# Patient Record
Sex: FEMALE | Race: WHITE | NOT HISPANIC OR LATINO | URBAN - METROPOLITAN AREA
[De-identification: names, ages, dates, MRNs, and addresses within clinical notes are randomized per-mention and may not be internally consistent; named-entity substitution may affect disease eponyms.]

---

## 2017-11-30 ENCOUNTER — RECORDS - HEALTHEAST (OUTPATIENT)
Dept: LAB | Facility: CLINIC | Age: 67
End: 2017-11-30

## 2017-11-30 LAB
CHOLEST SERPL-MCNC: 183 MG/DL
FASTING STATUS PATIENT QL REPORTED: NORMAL
HDLC SERPL-MCNC: 55 MG/DL
LDLC SERPL CALC-MCNC: 99 MG/DL
TRIGL SERPL-MCNC: 144 MG/DL

## 2017-12-01 LAB — HCV AB SERPL QL IA: NEGATIVE

## 2018-04-24 ENCOUNTER — AMBULATORY - HEALTHEAST (OUTPATIENT)
Dept: NEUROSURGERY | Facility: CLINIC | Age: 68
End: 2018-04-24

## 2018-04-24 ENCOUNTER — RECORDS - HEALTHEAST (OUTPATIENT)
Dept: ADMINISTRATIVE | Facility: OTHER | Age: 68
End: 2018-04-24

## 2018-04-26 ENCOUNTER — OFFICE VISIT - HEALTHEAST (OUTPATIENT)
Dept: NEUROSURGERY | Facility: CLINIC | Age: 68
End: 2018-04-26

## 2018-04-26 DIAGNOSIS — M43.8X9 SAGITTAL PLANE IMBALANCE: ICD-10-CM

## 2018-04-26 DIAGNOSIS — G95.20 CORD COMPRESSION MYELOPATHY (H): ICD-10-CM

## 2018-04-26 RX ORDER — ALBUTEROL SULFATE 90 UG/1
1 AEROSOL, METERED RESPIRATORY (INHALATION) 4 TIMES DAILY PRN
Status: SHIPPED | COMMUNITY
Start: 2016-12-07

## 2018-04-30 ENCOUNTER — HOSPITAL ENCOUNTER (OUTPATIENT)
Dept: RADIOLOGY | Facility: HOSPITAL | Age: 68
Discharge: HOME | End: 2018-04-30
Attending: NEUROLOGICAL SURGERY

## 2018-04-30 DIAGNOSIS — M43.8X9 SAGITTAL PLANE IMBALANCE: ICD-10-CM

## 2018-05-01 ENCOUNTER — RECORDS - HEALTHEAST (OUTPATIENT)
Dept: RADIOLOGY | Facility: CLINIC | Age: 68
End: 2018-05-01

## 2018-05-03 ENCOUNTER — OFFICE VISIT - HEALTHEAST (OUTPATIENT)
Dept: NEUROSURGERY | Facility: CLINIC | Age: 68
End: 2018-05-03

## 2018-05-03 DIAGNOSIS — G95.20 CORD COMPRESSION MYELOPATHY (H): ICD-10-CM

## 2018-05-03 ASSESSMENT — MIFFLIN-ST. JEOR: SCORE: 1414.67

## 2018-05-07 ENCOUNTER — RECORDS - HEALTHEAST (OUTPATIENT)
Dept: ADMINISTRATIVE | Facility: OTHER | Age: 68
End: 2018-05-07

## 2018-05-08 ENCOUNTER — COMMUNICATION - HEALTHEAST (OUTPATIENT)
Dept: NEUROSURGERY | Facility: CLINIC | Age: 68
End: 2018-05-08

## 2018-06-07 ENCOUNTER — AMBULATORY - HEALTHEAST (OUTPATIENT)
Dept: NEUROSURGERY | Facility: CLINIC | Age: 68
End: 2018-06-07

## 2018-06-08 ASSESSMENT — MIFFLIN-ST. JEOR: SCORE: 1432.81

## 2018-06-11 ENCOUNTER — OFFICE VISIT - HEALTHEAST (OUTPATIENT)
Dept: NEUROSURGERY | Facility: CLINIC | Age: 68
End: 2018-06-11

## 2018-06-11 ENCOUNTER — ANESTHESIA - HEALTHEAST (OUTPATIENT)
Dept: SURGERY | Facility: HOSPITAL | Age: 68
End: 2018-06-11

## 2018-06-11 DIAGNOSIS — G95.20 CORD COMPRESSION (H): ICD-10-CM

## 2018-06-12 ENCOUNTER — SURGERY - HEALTHEAST (OUTPATIENT)
Dept: SURGERY | Facility: HOSPITAL | Age: 68
End: 2018-06-12

## 2018-06-13 ASSESSMENT — MIFFLIN-ST. JEOR: SCORE: 1432.81

## 2018-06-14 ENCOUNTER — COMMUNICATION - HEALTHEAST (OUTPATIENT)
Dept: NEUROSURGERY | Facility: CLINIC | Age: 68
End: 2018-06-14

## 2018-06-14 DIAGNOSIS — G95.20 CORD COMPRESSION MYELOPATHY (H): ICD-10-CM

## 2018-06-27 ENCOUNTER — AMBULATORY - HEALTHEAST (OUTPATIENT)
Dept: NEUROSURGERY | Facility: CLINIC | Age: 68
End: 2018-06-27

## 2018-07-09 ENCOUNTER — HOSPITAL ENCOUNTER (OUTPATIENT)
Dept: RADIOLOGY | Facility: HOSPITAL | Age: 68
Discharge: HOME | End: 2018-07-09
Attending: NEUROLOGICAL SURGERY

## 2018-07-09 ENCOUNTER — COMMUNICATION - HEALTHEAST (OUTPATIENT)
Dept: TELEHEALTH | Facility: CLINIC | Age: 68
End: 2018-07-09

## 2018-07-09 DIAGNOSIS — G95.20 CORD COMPRESSION MYELOPATHY (H): ICD-10-CM

## 2018-07-11 ENCOUNTER — OFFICE VISIT - HEALTHEAST (OUTPATIENT)
Dept: NEUROSURGERY | Facility: CLINIC | Age: 68
End: 2018-07-11

## 2018-07-11 DIAGNOSIS — G95.20 CERVICAL CORD COMPRESSION WITH MYELOPATHY (H): ICD-10-CM

## 2018-07-11 ASSESSMENT — MIFFLIN-ST. JEOR: SCORE: 1432.81

## 2018-07-30 ENCOUNTER — OFFICE VISIT - HEALTHEAST (OUTPATIENT)
Dept: PHYSICAL THERAPY | Facility: REHABILITATION | Age: 68
End: 2018-07-30

## 2018-07-30 DIAGNOSIS — R26.9 NEUROLOGIC GAIT DYSFUNCTION: ICD-10-CM

## 2018-07-30 DIAGNOSIS — G89.29 CHRONIC LOW BACK PAIN WITH SCIATICA: ICD-10-CM

## 2018-07-30 DIAGNOSIS — M54.2 ACUTE NECK PAIN: ICD-10-CM

## 2018-07-30 DIAGNOSIS — R26.89 POOR BALANCE: ICD-10-CM

## 2018-07-30 DIAGNOSIS — M54.41 CHRONIC RIGHT-SIDED LOW BACK PAIN WITH RIGHT-SIDED SCIATICA: ICD-10-CM

## 2018-07-30 DIAGNOSIS — G89.29 CHRONIC RIGHT-SIDED LOW BACK PAIN WITH RIGHT-SIDED SCIATICA: ICD-10-CM

## 2018-07-30 DIAGNOSIS — M54.40 CHRONIC LOW BACK PAIN WITH SCIATICA: ICD-10-CM

## 2018-07-30 DIAGNOSIS — M62.81 GENERALIZED MUSCLE WEAKNESS: ICD-10-CM

## 2018-08-06 ENCOUNTER — OFFICE VISIT - HEALTHEAST (OUTPATIENT)
Dept: PHYSICAL THERAPY | Facility: REHABILITATION | Age: 68
End: 2018-08-06

## 2018-08-06 DIAGNOSIS — R26.9 NEUROLOGIC GAIT DYSFUNCTION: ICD-10-CM

## 2018-08-06 DIAGNOSIS — M54.2 ACUTE NECK PAIN: ICD-10-CM

## 2018-08-06 DIAGNOSIS — G89.29 CHRONIC RIGHT-SIDED LOW BACK PAIN WITH RIGHT-SIDED SCIATICA: ICD-10-CM

## 2018-08-06 DIAGNOSIS — R26.89 POOR BALANCE: ICD-10-CM

## 2018-08-06 DIAGNOSIS — M54.41 CHRONIC RIGHT-SIDED LOW BACK PAIN WITH RIGHT-SIDED SCIATICA: ICD-10-CM

## 2018-08-06 DIAGNOSIS — M62.81 GENERALIZED MUSCLE WEAKNESS: ICD-10-CM

## 2018-08-13 ENCOUNTER — OFFICE VISIT - HEALTHEAST (OUTPATIENT)
Dept: PHYSICAL THERAPY | Facility: REHABILITATION | Age: 68
End: 2018-08-13

## 2018-08-13 DIAGNOSIS — M62.81 GENERALIZED MUSCLE WEAKNESS: ICD-10-CM

## 2018-08-13 DIAGNOSIS — R26.9 NEUROLOGIC GAIT DYSFUNCTION: ICD-10-CM

## 2018-08-13 DIAGNOSIS — M54.2 ACUTE NECK PAIN: ICD-10-CM

## 2018-08-13 DIAGNOSIS — M54.41 CHRONIC RIGHT-SIDED LOW BACK PAIN WITH RIGHT-SIDED SCIATICA: ICD-10-CM

## 2018-08-13 DIAGNOSIS — G89.29 CHRONIC RIGHT-SIDED LOW BACK PAIN WITH RIGHT-SIDED SCIATICA: ICD-10-CM

## 2018-08-13 DIAGNOSIS — R26.89 POOR BALANCE: ICD-10-CM

## 2018-09-17 ENCOUNTER — OFFICE VISIT - HEALTHEAST (OUTPATIENT)
Dept: PHYSICAL THERAPY | Facility: REHABILITATION | Age: 68
End: 2018-09-17

## 2018-09-17 DIAGNOSIS — R26.9 NEUROLOGIC GAIT DYSFUNCTION: ICD-10-CM

## 2018-09-17 DIAGNOSIS — G89.29 CHRONIC RIGHT-SIDED LOW BACK PAIN WITH RIGHT-SIDED SCIATICA: ICD-10-CM

## 2018-09-17 DIAGNOSIS — R26.89 POOR BALANCE: ICD-10-CM

## 2018-09-17 DIAGNOSIS — M54.2 ACUTE NECK PAIN: ICD-10-CM

## 2018-09-17 DIAGNOSIS — M62.81 GENERALIZED MUSCLE WEAKNESS: ICD-10-CM

## 2018-09-17 DIAGNOSIS — M54.41 CHRONIC RIGHT-SIDED LOW BACK PAIN WITH RIGHT-SIDED SCIATICA: ICD-10-CM

## 2018-11-19 ENCOUNTER — RECORDS - HEALTHEAST (OUTPATIENT)
Dept: LAB | Facility: CLINIC | Age: 68
End: 2018-11-19

## 2018-11-19 LAB
ALBUMIN SERPL-MCNC: 3.9 G/DL (ref 3.5–5)
ALP SERPL-CCNC: 100 U/L (ref 45–120)
ALT SERPL W P-5'-P-CCNC: 19 U/L (ref 0–45)
ANION GAP SERPL CALCULATED.3IONS-SCNC: 12 MMOL/L (ref 5–18)
AST SERPL W P-5'-P-CCNC: 18 U/L (ref 0–40)
BILIRUB SERPL-MCNC: 0.8 MG/DL (ref 0–1)
BUN SERPL-MCNC: 22 MG/DL (ref 8–22)
CALCIUM SERPL-MCNC: 9.8 MG/DL (ref 8.5–10.5)
CHLORIDE BLD-SCNC: 101 MMOL/L (ref 98–107)
CO2 SERPL-SCNC: 27 MMOL/L (ref 22–31)
CREAT SERPL-MCNC: 0.66 MG/DL (ref 0.6–1.1)
GFR SERPL CREATININE-BSD FRML MDRD: >60 ML/MIN/1.73M2
GLUCOSE BLD-MCNC: 89 MG/DL (ref 70–125)
POTASSIUM BLD-SCNC: 4.7 MMOL/L (ref 3.5–5)
PROT SERPL-MCNC: 7.1 G/DL (ref 6–8)
SODIUM SERPL-SCNC: 140 MMOL/L (ref 136–145)

## 2019-11-18 ENCOUNTER — RECORDS - HEALTHEAST (OUTPATIENT)
Dept: LAB | Facility: CLINIC | Age: 69
End: 2019-11-18

## 2019-11-18 LAB
ALBUMIN SERPL-MCNC: 3.9 G/DL (ref 3.5–5)
ALP SERPL-CCNC: 95 U/L (ref 45–120)
ALT SERPL W P-5'-P-CCNC: 15 U/L (ref 0–45)
ANION GAP SERPL CALCULATED.3IONS-SCNC: 12 MMOL/L (ref 5–18)
AST SERPL W P-5'-P-CCNC: 15 U/L (ref 0–40)
BILIRUB SERPL-MCNC: 0.8 MG/DL (ref 0–1)
BUN SERPL-MCNC: 22 MG/DL (ref 8–28)
CALCIUM SERPL-MCNC: 9.3 MG/DL (ref 8.5–10.5)
CHLORIDE BLD-SCNC: 105 MMOL/L (ref 98–107)
CHOLEST SERPL-MCNC: 182 MG/DL
CO2 SERPL-SCNC: 26 MMOL/L (ref 22–31)
CREAT SERPL-MCNC: 0.7 MG/DL (ref 0.6–1.1)
ERYTHROCYTE [DISTWIDTH] IN BLOOD BY AUTOMATED COUNT: 13.6 % (ref 11–14.5)
FASTING STATUS PATIENT QL REPORTED: ABNORMAL
GFR SERPL CREATININE-BSD FRML MDRD: >60 ML/MIN/1.73M2
GLUCOSE BLD-MCNC: 96 MG/DL (ref 70–125)
HCT VFR BLD AUTO: 43.1 % (ref 35–47)
HDLC SERPL-MCNC: 49 MG/DL
HGB BLD-MCNC: 13.6 G/DL (ref 12–16)
LDLC SERPL CALC-MCNC: 101 MG/DL
MCH RBC QN AUTO: 30.9 PG (ref 27–34)
MCHC RBC AUTO-ENTMCNC: 31.6 G/DL (ref 32–36)
MCV RBC AUTO: 98 FL (ref 80–100)
PLATELET # BLD AUTO: 254 THOU/UL (ref 140–440)
PMV BLD AUTO: 10.6 FL (ref 8.5–12.5)
POTASSIUM BLD-SCNC: 4.8 MMOL/L (ref 3.5–5)
PROT SERPL-MCNC: 7 G/DL (ref 6–8)
RBC # BLD AUTO: 4.4 MILL/UL (ref 3.8–5.4)
SODIUM SERPL-SCNC: 143 MMOL/L (ref 136–145)
T3 SERPL-MCNC: 79 NG/DL (ref 45–175)
T3FREE SERPL-MCNC: 2.8 PG/ML (ref 1.9–3.9)
T4 FREE SERPL-MCNC: 1 NG/DL (ref 0.7–1.8)
TRIGL SERPL-MCNC: 162 MG/DL
TSH SERPL DL<=0.005 MIU/L-ACNC: 2.21 UIU/ML (ref 0.3–5)
WBC: 8.6 THOU/UL (ref 4–11)

## 2019-11-21 LAB — T3REVERSE SERPL-MCNC: 23.4 NG/DL (ref 9–27)

## 2019-11-25 LAB
B BURGDOR AB SER-IMP: NORMAL
LYME AB IGG BAND(S): NORMAL
LYME AB IGM BAND(S): NORMAL
LYME IGG BLOT: NEGATIVE
LYME IGM BLOT: NEGATIVE

## 2021-04-08 ENCOUNTER — RECORDS - HEALTHEAST (OUTPATIENT)
Dept: LAB | Facility: CLINIC | Age: 71
End: 2021-04-08

## 2021-04-08 LAB
ALBUMIN SERPL-MCNC: 3.9 G/DL (ref 3.5–5)
ALP SERPL-CCNC: 83 U/L (ref 45–120)
ALT SERPL W P-5'-P-CCNC: 15 U/L (ref 0–45)
ANION GAP SERPL CALCULATED.3IONS-SCNC: 11 MMOL/L (ref 5–18)
AST SERPL W P-5'-P-CCNC: 14 U/L (ref 0–40)
BILIRUB SERPL-MCNC: 0.9 MG/DL (ref 0–1)
BUN SERPL-MCNC: 19 MG/DL (ref 8–28)
CALCIUM SERPL-MCNC: 8.7 MG/DL (ref 8.5–10.5)
CHLORIDE BLD-SCNC: 103 MMOL/L (ref 98–107)
CHOLEST SERPL-MCNC: 179 MG/DL
CO2 SERPL-SCNC: 27 MMOL/L (ref 22–31)
CREAT SERPL-MCNC: 0.69 MG/DL (ref 0.6–1.1)
FASTING STATUS PATIENT QL REPORTED: ABNORMAL
GFR SERPL CREATININE-BSD FRML MDRD: >60 ML/MIN/1.73M2
GLUCOSE BLD-MCNC: 95 MG/DL (ref 70–125)
HDLC SERPL-MCNC: 51 MG/DL
LDLC SERPL CALC-MCNC: 93 MG/DL
POTASSIUM BLD-SCNC: 4.3 MMOL/L (ref 3.5–5)
PROT SERPL-MCNC: 6.9 G/DL (ref 6–8)
SODIUM SERPL-SCNC: 141 MMOL/L (ref 136–145)
TRIGL SERPL-MCNC: 173 MG/DL
VIT B12 SERPL-MCNC: 687 PG/ML (ref 213–816)

## 2021-04-09 LAB — 25(OH)D3 SERPL-MCNC: 54.3 NG/ML (ref 30–80)

## 2021-06-01 VITALS — BODY MASS INDEX: 34.16 KG/M2 | HEIGHT: 65 IN | WEIGHT: 205 LBS

## 2021-06-01 VITALS — WEIGHT: 201 LBS | HEIGHT: 65 IN | BODY MASS INDEX: 33.49 KG/M2

## 2021-06-01 VITALS — HEIGHT: 65 IN | BODY MASS INDEX: 34.16 KG/M2 | WEIGHT: 205 LBS

## 2021-06-16 PROBLEM — G95.20 CERVICAL CORD COMPRESSION WITH MYELOPATHY (H): Status: ACTIVE | Noted: 2018-06-12

## 2021-06-17 NOTE — PROGRESS NOTES
NEUROSURGERY CONSULTATION NOTE:    Assessment:    1. Sagittal plane imbalance  XR Scoliosis AP and Lateral Standing   2. Cord compression myelopathy         Plan: I have asked her to go for scoliosis x-rays to check for sagittal imbalance.  Her head and neck are very far forward and appear to be out of line with her pelvis.  She also has multilevel degenerative disease in both the cervical and lumbar spine.  This would be in keeping with sagittal imbalance.  I will see her back in a week after the x-rays are reviewed.  We will try to present her at the conference on Wednesday.  Would like to hear what my partners in spine have to say regarding recommendations.  Time spent in evaluation of available information, personal interpretation of imaging, documentation, coordination of care and face-to-face discussion was 45 minutes.    Olesya Stephen   [address] [facility] [id number]  67 y.o. female is sent to me in consultation   by Francine Piña MD     CC: Neck and bilateral shoulder pain    HPI:  Neurosurgery consultation was requested by: Dr. Francine Piña  Pain: Neck pain   Radicular Pain is present: Radiates into both shoulders and occasionally down both arms, right worse   Lhermitte sign: No   Motor complaints: Weakness in both arms and both legs   Sensory complaints: Numbness in both arms and legs   Gait and balance issues: Pt has no balance and gait is off  Bowel or bladder issues: Slight bowel numbness   Duration of SX is: 14 years, progressed in the last 4 years   The symptoms are worse with: Siting in hard chair and standing and walking   The symptoms are better with: Laying down   Injury: Not sure   Severity is:Chronic   Patient has tried the following conservative measures: Pt has done PT and gets short term relief.      PROBLEM LIST:  1. Sagittal plane imbalance     2. Cord compression myelopathy            REVIEW OF SYSTEMS:  A 12 point review of systems has been completed.  She has occasional bleeding  from hemorrhoids.  She can also suffer from constipation.  She has a personal history of arthritis.  The neurological symptoms are listed above.  Otherwise, the review is negative      Past Medical History:   Diagnosis Date     Hyperlipidemia         Vitamin D deficiency       Lumbar radiculopathy       Seborrhea      Past surgical history:  Adenoidectomy  Hysterectomy with cystocele and rectocele repair   Bilateral cataracts with lens replacement         MEDICATIONS:  Current Outpatient Prescriptions   Medication Sig Dispense Refill     cholecalciferol, vitamin D3, 1,000 unit Chew        simvastatin (ZOCOR) 20 MG tablet TAKE 1 TABLET BY MOUTH DAILY AT BEDTIME       albuterol (PROAIR HFA;PROVENTIL HFA;VENTOLIN HFA) 90 mcg/actuation inhaler Inhale 1 puff.       cyanocobalamin, vitamin B-12, 2,500 mcg Chew Chew.       No current facility-administered medications for this visit.          ALLERGIES/SENSITIVITIES:     Allergies   Allergen Reactions     Penicillins Unknown     Childhood Rxn       PERTINENT SOCIAL HISTORY:   Social History     Social History     Marital status:      Spouse name: N/A     Number of children: N/A     Years of education: N/A     Social History Main Topics     Smoking status: Never Smoker     Smokeless tobacco: Never Used     Alcohol use None     Drug use: None     Sexual activity: Not Asked       FAMILY HISTORY:  No family history of inherited spinal disorders  Father is  at 84 years with heart disease  Mother is  at 83 years with glaucoma and heart disease    PHYSICAL EXAM:   Constitutional: /74  Pulse 84  SpO2 92%    General appearance: Appropriately groomed.  No acute distress.  Interactive.     Mental Status: Mental status: Alert and oriented, mood and affect appropriate, language reception and expression normal, recent and remote memory is normal, higher cortical function normal. Attention span, concentration and ability to follow commands is  normal.       Cranial Nerves: Face is symmetric.  Extraocular movements are full, conjugate and without nystagmus.  Hearing is preserved.  Shoulder position is symmetric.  Tongue is midline with normal motion.       Motor: Motor exam nl bilateral UE and LE to confrontation testing, but she feels weakness compared to status 10 years ago. Tone nl, bulk nl and strength 5/5 all groups.      Sensory: Sensory exam by subjective report intact to LT,PP,Position and Vib. in the UE and  LE, with the exception of tingling in the arms and legs.     Station and Gait:  Unsteady wide based gait.     Reflexes; reflexes are hard to elicit.  Negative Chaudhary's.    IMAGING:  I have personally reviewed the images and discussed the findings with Olesya Stephen.  T2 hyperintensity is noted from C2 through C7.  This represents myelomalacia.  She has significant spinal canal stenosis at C3-4, 4 5, 5 6 and 6 7.  He has multilevel and bilateral foraminal stenosis discussed further in the report.  She also has hyperlordosis.    CC:     Francine Piña [id number] [place] [address]

## 2021-06-17 NOTE — PROGRESS NOTES
Olesya is here to discuss results of x-rays.     She states symptoms are unchanged since last seen in clinic.   Olesya c/o neck pain radiating into both shoulders and arms. She has weakness and numbness in both arms and legs as well.   NDI today is 42%    I did review these strays and find that there is no significant sagittal imbalance or scoliosis.  I have recommended an open right C3 through C7 laminoplasty.  I discussed the risks and benefits of this intervention to include the risk of infection, bleeding and persistent neurological symptoms.  I also discussed that she would be wearing a collar for 3-4 weeks after surgery.  They would like to do this in early June.  I will go ahead and submit the orders.  Time spent in evaluation of the standing images, documentation, coordination of care and face-to-face discussion was 15 minutes.    Fernanda Jones MD, FACS, FAANS

## 2021-06-17 NOTE — PROGRESS NOTES
Olesya is here to discuss results of x-rays. She states symptoms are unchanged since last seen in clinic. Olesya c/o neck pain radiating into both shoulders and arms. She has weakness and numbness in both arms and legs as well.   NDI today is 42%  JShowen,CMA

## 2021-06-17 NOTE — PROGRESS NOTES
Neurosurgery consultation was requested by: Dr. Francine Piña  Pain: Neck pain   Radicular Pain is present: Radiates into both shoulders and occasionally down both arms, right worse   Lhermitte sign: No   Motor complaints: Weakness in both arms and both legs   Sensory complaints: Numbness in both arms and legs   Gait and balance issues: Pt has no balance and gait is off  Bowel or bladder issues: Slight bowel numbness   Duration of SX is: 14 years, progressed in the last 4 years   The symptoms are worse with: Siting in hard chair and standing and walking   The symptoms are better with: Laying down   Injury: Not sure   Severity is:Chronic   Patient has tried the following conservative measures: Pt has done PT and gets short term relief.  NDI score is :   JAMARCUS Wang

## 2021-06-18 NOTE — ANESTHESIA PREPROCEDURE EVALUATION
Anesthesia Evaluation      Patient summary reviewed   History of anesthetic complications     Airway   Mallampati: I  Neck ROM: full   Pulmonary - normal exam   (+) asthma  mild,  well controlled,                          Cardiovascular - normal exam  (+) , hypercholesterolemia,     (-) murmur  ECG reviewed  Rhythm: regular  Rate: normal,    no murmur      Neuro/Psych    (+) neuromuscular disease,      Endo/Other - negative ROS      GI/Hepatic/Renal - negative ROS           Dental - normal exam                        Anesthesia Plan  Planned anesthetic: general endotracheal  Decadron,Zofran,background propofol  ASA 2   Induction: intravenous   Anesthetic plan and risks discussed with: patient  Anesthesia plan special considerations: antiemetics, arterial catheterization,   Post-op plan: routine recovery

## 2021-06-18 NOTE — PROGRESS NOTES
Preop Assessment: Olesya Stephen presents for pre-op review.  Surgeon: Dr. Fernanda Jones  Name of Surgery: Right open cervical laminoplasty C3-C7  Diagnosis: cord compression  Date of Surgery: 06/12/2018  Time of Surgery: 0730  Hospital: [facility]  H&P: by Dr. Piña on 6/6/2018 - cleared for surgery  History of ASA, NSAIDS, vitamin and/or herbal supplements within 10 days: Yes - stopped ASA and NSAIDS  History of blood thinners: No  History of anti-seizure med's: No  Review of systems: unchanged    Diagnostics:  Labs: WNL  CXR: n/a  EKG: NSR  Other: [employer] J collar to OR  Films: MRI cervical from 2/21/2018 - in Nil      Last BM - 3/10/2018  Nausea or Vomiting - denies  Urinary retention - denies  Pain management - no Red Flags  Home PT Evaluation: ambulates independently, steady gait and stride, no imbalance noted  - no indication for Home PT pre-op  Patient confirmed they have help/assistance in place at home upon discharge      All questions answered regarding surgery and expected pre and postoperative course including rehabilitation phase.     Reviewed with patient: Arrive 2.5 -3 hours prior to scheduled surgery, nothing to eat or drink after midnight the night before surgery and bring all pertinent films to the hospital the day of surgery.  Continue to refrain from NSAIDS (Ibuprofen, Aleve, Naprosyn) ASA or over the counter herbal medication or supplements, anticoagulants and blood thinners.    Preop skin preparations and instructions provided.    Incentive Spirometer usage instructions provided.    Patient confirmed they have help/assistance in place at home upon discharge.    Consent was signed.    Cristin Azul RN, CNRN

## 2021-06-18 NOTE — PROGRESS NOTES
Olesya Stephen is status post open door laminoplasty C3-C7 open on right, Cervical foraminotomies at C4-5, C5-6, and C6-7, right on 6/12/2018 by Dr. Ferannda Jones.  Preoperatively presented with complaints of progressive myelopathy.  Today she is here with her sister for staples removal. She is doing very well - her incisional discomfort is minimal. She denies radicular pain, sensory or motor issues in UE. Gait and balance are normal. Uses heat on her shoulders. Wears a [employer] J collar.      Surgical wound WNL - CDI, no signs of infection or skin breakdown.  Incision well-healed: good skin approximation, no redness or visible/palpable edema, no tenderness to palpation.  PT. AF, denies fever, chills or sweats.  Pt. reports that the symptoms are improved from pre-op.    Staples - intact removed without difficulty. Wound prepped with Betadine before and after removal.  Surrounding skin has no signs of breakdown.  Verbal instructions regarding incision care are given.  Pt. advised to call us if any s/s of infection noted - all discussed in details.

## 2021-06-18 NOTE — ANESTHESIA CARE TRANSFER NOTE
Last vitals:   Vitals:    06/12/18 1126   BP: 176/87   Pulse: (!) 103   Resp: 16   Temp: 36.2  C (97.2  F)   SpO2: 99%     Patient's level of consciousness is drowsy  Spontaneous respirations: yes  Maintains airway independently: yes  Dentition unchanged: yes  Oropharynx: oropharynx clear of all foreign objects    QCDR Measures:  ASA# 20 - Surgical Safety Checklist: WHO surgical safety checklist completed prior to induction  PQRS# 430 - Adult PONV Prevention: 4558F - Pt received => 2 anti-emetic agents (different classes) preop & intraop  ASA# 8 - Peds PONV Prevention: NA - Not pediatric patient, not GA or 2 or more risk factors NOT present  PQRS# 424 - [place]-op Temp Management: 4559F - At least one body temp DOCUMENTED => 35.5C or 95.9F within required timeframe  PQRS# 426 - PACU Transfer Protocol: - Transfer of care checklist used  ASA# 14 - Acute Post-op Pain: ASA14B - Patient did NOT experience pain >= 7 out of 10

## 2021-06-18 NOTE — ANESTHESIA POSTPROCEDURE EVALUATION
Patient: Olesya Stephen  RIGHT OPEN CERVICAL 3,4,5 Laminectomy ,CERVICAL 7 LAMINOPLASTY  Anesthesia type: general    Patient location: PACU  Last vitals:   Vitals:    06/12/18 1215   BP: 153/87   Pulse: 92   Resp: 19   Temp:    SpO2: 96%     Post vital signs: stable  Level of consciousness: awake and responds to simple questions  Post-anesthesia pain: pain controlled  Post-anesthesia nausea and vomiting: no  Pulmonary: unassisted, return to baseline  Cardiovascular: stable and blood pressure at baseline  Hydration: adequate  Anesthetic events: no    QCDR Measures:  ASA# 11 - [place]-op Cardiac Arrest: ASA11B - Patient did NOT experience unanticipated cardiac arrest  ASA# 12 - [place]-op Mortality Rate: ASA12B - Patient did NOT die  ASA# 13 - PACU Re-Intubation Rate: ASA13B - Patient did NOT require a new airway mgmt  ASA# 10 - Composite Anes Safety: ASA10A - No serious adverse event    Additional Notes:

## 2021-06-19 NOTE — PROGRESS NOTES
[facility] Daily Progress     Patient Name: Olesya Stephen  Date: 8/13/2018  Visit #: 3/12  Referring Provider: Radha Swann CNP  Referring Diagnosis:   Cervical cord compression with myelopathy (H) [G95.20]  - Primary          Visit Diagnosis:     ICD-10-CM    1. Acute neck pain M54.2    2. Generalized muscle weakness M62.81    3. Poor balance R26.89    4. Neurologic gait dysfunction R26.9    5. Chronic right-sided low back pain with right-sided sciatica M54.41     G89.29        Assessment:     Pt demo's good laterality and graphesthesia B hand sensation but diminished stereognosis R hand.    Pt able to perform walking program up to 20 minutes.       Patient is benefitting from skilled physical therapy and is making steady progress toward functional goals.  Patient is appropriate to continue with skilled physical therapy intervention, as indicated by initial plan of care.    Goal Status: - ongoing  Pt. will demonstrate/verbalize independence in self-management of condition in : 12 weeks    Pt. will be able to walk : 30 minutes;with less pain;with less difficulty;for household mobility;for community mobility;for exercise/recreation;in 12 weeks - had a day of stiffness last week but by end of day felt okay    Patient will ascend / descend: stairs;with less pain;with less difficulty;in 12 weeks - IMPROVING for R hip pain    Patient will transfer: sit/stand;floor/stand;for car;for toileting;for in/out of bed;for in/out of chair;supine/sit;with less pain;with less difficulty;in 12 weeks - IMPROVING - less pain in hip    Pt will: be able to perform knitting and quilting with minimal pain and difficullty in 12 weeks for improved QOL. - IMPROVING - on her 6th prayer shawl    Plan / Patient Education:     Continue with initial plan of care.   Assess response to shoulder rows and chin tucks.  Assess stereognosis with dominoes or bucket of beads.  Attempt chin tuck progression and trunk  strengthening.    Subjective:     Pain Ratin/10  R hip/low back pain sore today.  Pt reports some tenderness to neck with sitting extended but otherwise that is feeling good.    Patient Outcome Measures:  Neck Disability Score in %: 30   Scores range from 0-100%, where a score of 0% represents minimal pain and maximal function. The minmal clinically important difference is a score reduction of 10%. FROM INITIAL    Objective:     Laterality 95% with Recognise shoulder lilly with 1.4 sec processing time (normative data is % accuracy with 2.0 sec processing speed)    Graphesthesia 90% 0-9 dorsal hand B     Stereognosis - pt had moderate difficulty with finding small and similar feeling items in pillow case    FROM LAST VISITS   Mild difficulty with shoulder strengthening exercises, moderate difficulty without increase in back pain with core/hip strengthening exercises    Treatment Today      TREATMENT MINUTES COMMENTS   Evaluation     Self-care/ Home management     Manual therapy     Neuromuscular Re-education 30 Assessed laterality, graphesthesia and stereognosis. Added stereognosis to home program with written instructions printed.   Therapeutic Activity     Therapeutic Exercises 15 Performed and added to HEP per pt instructions and printed for home.   Gait training     Modality__________________                Total 45    Blank areas are intentional and mean the treatment did not include these items.       Abril Celaya PT, DPT, OCS, CLT  2018  11:05 AM

## 2021-06-19 NOTE — PROGRESS NOTES
[facility] Daily Progress     Patient Name: Olesya Stephen  Date: 2018  Visit #:   Referring Provider: Radha Swann CNP  Referring Diagnosis:   Cervical cord compression with myelopathy (H) [G95.20]  - Primary          Visit Diagnosis:     ICD-10-CM    1. Acute neck pain M54.2    2. Generalized muscle weakness M62.81    3. Poor balance R26.89    4. Neurologic gait dysfunction R26.9    5. Chronic right-sided low back pain with right-sided sciatica M54.41     G89.29        Assessment:     Pt able to perform walking program up to 20 minutes. Pain can vary with weather changes.    Patient is benefitting from skilled physical therapy and is making steady progress toward functional goals.  Patient is appropriate to continue with skilled physical therapy intervention, as indicated by initial plan of care.    Goal Status: - ongoing  Pt. will demonstrate/verbalize independence in self-management of condition in : 12 weeks  Pt. will be able to walk : 30 minutes;with less pain;with less difficulty;for household mobility;for community mobility;for exercise/recreation;in 12 weeks  Patient will ascend / descend: stairs;with less pain;with less difficulty;in 12 weeks  Patient will transfer: sit/stand;floor/stand;for car;for toileting;for in/out of bed;for in/out of chair;supine/sit;with less pain;with less difficulty;in 12 weeks  Pt will: be able to perform knitting and quilting with minimal pain and difficullty in 12 weeks for improved QOL.    Plan / Patient Education:     Continue with initial plan of care.   Assess response to shoulder scap plane and ER strength and bridges and hip SLR.  Assess laterality and graphesthesia for B hand and foot sensory discrimination.  Attempt rows and chin tucks.    Subjective:     Pain Ratin  Pt reports she can have good days and not so good days. Weather seems to play a role in how well pt feels.  Pt reports walking about 15-20 minute walks - had 1 with hills  and wasn't too short of breath.    Patient Outcome Measures:  Neck Disability Score in %: 30   Scores range from 0-100%, where a score of 0% represents minimal pain and maximal function. The minmal clinically important difference is a score reduction of 10%.    Objective:      Mild difficulty with shoulder strengthening exercises, moderate difficulty without increase in back pain with core/hip strengthening exercises    Treatment Today      TREATMENT MINUTES COMMENTS   Evaluation     Self-care/ Home management     Manual therapy     Neuromuscular Re-education     Therapeutic Activity     Therapeutic Exercises 30 Discussed walking program.  Performed and initiated HEP per pt instructions and printed for home.   Gait training     Modality__________________                Total 30    Blank areas are intentional and mean the treatment did not include these items.       Abril Celaya PT, DPT, OCS, CLT  8/6/2018  11:05 AM

## 2021-06-19 NOTE — PROGRESS NOTES
"CHART NOTE     DATE OF SERVICE:  2018     : 1950   67 y.o.     ASSESSMENT :   Doing well with improvement in symptoms and function.       PLAN: Wean from collar/brace. Loosen restrictions. Refer to PT. RTC prn. Enc to call with any new questions or concerns.     HPI:  Olesya Stephen is status post  open door laminoplasty C7 open on right, Cervical cervical laminectomies C3-6 and foraminotomies at C4-5, C5-6, and C6-7, right on 2018 by Dr. Fernanda Jones.  Preoperatively presented with complaints of progressive myelopathy.      TODAY, Olesya Stephen reports min neck pain, has the anticipated shoulder pain.  No arm pain, no new N/T-had a right shoulder injury years ago and the R arm has always bothered her some since.  Balance and walking are improved. Can walk now without weaving.      PAST MEDICAL HISTORY, SURGICAL HISTORY, REVIEW OF SYMPTOMS, MEDICATIONS AND ALLERGIES:  Past medical history, surgical history, ROS, medications and allergies reviewed with patient and remain unchanged from previous visit.    Past Medical History:   Diagnosis Date     B12 deficiency      Hyperlipidemia      Lumbar radiculopathy      Neck pain      PONV (postoperative nausea and vomiting)      Seborrhea      Vitamin D deficiency        PHYSICAL EXAM:    /75  Pulse 74  Ht 5' 4.5\" (1.638 m)  Wt 205 lb (93 kg)  SpO2 96%  BMI 34.64 kg/m2      Neurological exam reveals:  Respirations easy, non-labored.   Skin: W/D/I. No rashes, lesions or breaks in integrity.   Recent and remote memory intact, fund of knowledge wnl.    Alert and oriented x3, speech fluent and appropriate.   PERRL, EOMI, No nystagmus,   Face symmetric, tongue midline, Uvula midline,  palate rises with phonation   Shoulder shrug equal  Arm strength bilateral grasp, biceps, triceps, and deltoids 5/5   Leg strength bilateral dorsiflexion, plantar flexion, and hip flexion 5/5  No extremity edema noted.   Can heel/toe walk, do toe rises and squats " without difficulty.   Muscle Bulk and tone wnl.   Reflexes: No pathological reflexes   Gait and station:Normal, can tandem gait now  Incision: CDI without erythema or edema  NDI/DOROTHEA: 9%     RADIOGRAPHIC IMAGING: Head CT:  Decompressive laminectomies C3-C6 and right-sided laminoplasty at C7, hardware intact and alignment maintained.     Films personally reviewed and interpreted.   Reviewed imaging with patient and family.

## 2021-06-20 NOTE — PROGRESS NOTES
[facility] Discharge Summary    Patient Name: Olesya Stephen  Date: 12/31/2018  Referring provider: Radha Swann CNP  Visit Diagnosis:     ICD-10-CM    1. Acute neck pain M54.2    2. Generalized muscle weakness M62.81    3. Poor balance R26.89    4. Neurologic gait dysfunction R26.9    5. Chronic right-sided low back pain with right-sided sciatica M54.41     G89.29        Goal Status:  See status in note below.    Patient was seen for 4 visits from 7/30/18 to 9/17/18 with 1 cancelled appointment.    The patient attended therapy initially, but did not finish the therapy sessions prescribed as pt did not return for f/u.    Therapy will be discontinued at this time.  The patient will need a new referral to resume.    Thank you for your referral.  Abril Celaya PT, DPT, OCS, CLT  12/31/2018   2:50 PM      [facility] Daily Progress     Patient Name: Olesya Stephen  Date: 9/17/2018  Visit #: 4/12  Referring Provider: Radha Swann CNP  Referring Diagnosis:   Cervical cord compression with myelopathy (H) [G95.20]  - Primary          Visit Diagnosis:     ICD-10-CM    1. Acute neck pain M54.2    2. Generalized muscle weakness M62.81    3. Poor balance R26.89    4. Neurologic gait dysfunction R26.9    5. Chronic right-sided low back pain with right-sided sciatica M54.41     G89.29        Assessment:     Pt demo's improved neck ROM, limited ankle DF and limited hip strength.    Patient is benefitting from skilled physical therapy and is making steady progress toward functional goals.  Patient is appropriate to continue with skilled physical therapy intervention, as indicated by initial plan of care.    Goal Status: - ongoing  Pt. will demonstrate/verbalize independence in self-management of condition in : 12 weeks    Pt. will be able to walk : 30 minutes;with less pain;with less difficulty;for household mobility;for community mobility;for exercise/recreation;in 12 weeks - had a day of  stiffness last week but by end of day felt okay    Patient will ascend / descend: stairs;with less pain;with less difficulty;in 12 weeks - IMPROVING for R hip pain    Patient will transfer: sit/stand;floor/stand;for car;for toileting;for in/out of bed;for in/out of chair;supine/sit;with less pain;with less difficulty;in 12 weeks - IMPROVING - less pain in hip    Pt will: be able to perform knitting and quilting with minimal pain and difficullty in 12 weeks for improved QOL. - IMPROVING - on her 6th prayer shawl    Plan / Patient Education:     Continue with initial plan of care.   Attempt chin tuck progression and trunk strengthening.  Reassess APTA sit to stand and add for home program if able.  Reassess 2 minute walk test.    Subjective:     Pain Ratin/10 - medium pain now with calf and legs.    Pt reports doing well with neck pain - not bothering her much.    Pt reports she went to a [employer] game 3 weeks ago and felt her R calf really cramp up. Pt was able to stretch it can get some relief over a couple of days.  Then pt was traveling and doing a lot of walking in airports and a lot of stairs at their rental and felt R calf flare up again but this soreness went away quickly as well.    Pt has been playing cards with her  and shuffling cards and this feels like she is getting more dexterity with hands from this.    R hip/low back pain continue to be sore and felt like activity level with traveling aggravated. - Pt will ask primary MD for possibly another lumbar injection as she had success with this in the past.    Pt reports some tenderness to neck with sitting extended but otherwise that is feeling good.    Patient Outcome Measures:  Neck Disability Score in %: 30   Scores range from 0-100%, where a score of 0% represents minimal pain and maximal function. The minmal clinically important difference is a score reduction of 10%. FROM INITIAL    Objective:     Chin tuck x12 mmHg x10 seconds - and able to  do head lift and maintain chin tuck    Cervical R rotation 50   (was 35) and L rotation 55   (was 65), B SB 8 cm (was 9-10 cm) - mild R sided soreness since her fall on her R side    B ankle DF 2  and PF 77-80      Pt demo's B ankle valgus and pronation with stance.    FROM LAST VISITS  Laterality 95% with Recognise shoulder lilly with 1.4 sec processing time (normative data is % accuracy with 2.0 sec processing speed)    Graphesthesia 90% 0-9 dorsal hand B     Stereognosis - pt had moderate difficulty with finding small and similar feeling items in pillow case    Treatment Today      TREATMENT MINUTES COMMENTS   Evaluation     Self-care/ Home management     Manual therapy     Neuromuscular Re-education     Therapeutic Activity     Therapeutic Exercises 54 Reassessed neck ROM and strength and discussed results. Assessed B ankle ROM and discussed results.   Discussed progress and modifications to HEP and activity level.   Performed and added to HEP per pt instructions and printed for home.   Gait training     Modality__________________                Total 54    Blank areas are intentional and mean the treatment did not include these items.       Abril Celaya PT, DPT, OCS, CLT  9/17/2018  11:05 AM

## 2021-06-26 ENCOUNTER — HEALTH MAINTENANCE LETTER (OUTPATIENT)
Age: 71
End: 2021-06-26

## 2021-10-16 ENCOUNTER — HEALTH MAINTENANCE LETTER (OUTPATIENT)
Age: 71
End: 2021-10-16

## 2022-04-28 ENCOUNTER — LAB REQUISITION (OUTPATIENT)
Dept: LAB | Facility: CLINIC | Age: 72
End: 2022-04-28

## 2022-04-28 DIAGNOSIS — E78.2 MIXED HYPERLIPIDEMIA: ICD-10-CM

## 2022-04-28 DIAGNOSIS — K21.9 GASTRO-ESOPHAGEAL REFLUX DISEASE WITHOUT ESOPHAGITIS: ICD-10-CM

## 2022-04-28 DIAGNOSIS — G57.93 UNSPECIFIED MONONEUROPATHY OF BILATERAL LOWER LIMBS: ICD-10-CM

## 2022-04-28 LAB
ALBUMIN SERPL-MCNC: 3.8 G/DL (ref 3.5–5)
ALP SERPL-CCNC: 82 U/L (ref 45–120)
ALT SERPL W P-5'-P-CCNC: 13 U/L (ref 0–45)
ANION GAP SERPL CALCULATED.3IONS-SCNC: 13 MMOL/L (ref 5–18)
AST SERPL W P-5'-P-CCNC: 15 U/L (ref 0–40)
BILIRUB SERPL-MCNC: 0.6 MG/DL (ref 0–1)
BUN SERPL-MCNC: 21 MG/DL (ref 8–28)
CALCIUM SERPL-MCNC: 9.2 MG/DL (ref 8.5–10.5)
CHLORIDE BLD-SCNC: 104 MMOL/L (ref 98–107)
CHOLEST SERPL-MCNC: 176 MG/DL
CO2 SERPL-SCNC: 26 MMOL/L (ref 22–31)
CREAT SERPL-MCNC: 0.64 MG/DL (ref 0.6–1.1)
ERYTHROCYTE [DISTWIDTH] IN BLOOD BY AUTOMATED COUNT: 13.4 % (ref 10–15)
FASTING STATUS PATIENT QL REPORTED: NORMAL
FOLATE SERPL-MCNC: 3.3 NG/ML
GFR SERPL CREATININE-BSD FRML MDRD: >90 ML/MIN/1.73M2
GLUCOSE BLD-MCNC: 90 MG/DL (ref 70–125)
HCT VFR BLD AUTO: 44.4 % (ref 35–47)
HDLC SERPL-MCNC: 55 MG/DL
HGB BLD-MCNC: 13.9 G/DL (ref 11.7–15.7)
LDLC SERPL CALC-MCNC: 94 MG/DL
MCH RBC QN AUTO: 30.2 PG (ref 26.5–33)
MCHC RBC AUTO-ENTMCNC: 31.3 G/DL (ref 31.5–36.5)
MCV RBC AUTO: 96 FL (ref 78–100)
PLATELET # BLD AUTO: 247 10E3/UL (ref 150–450)
POTASSIUM BLD-SCNC: 4.7 MMOL/L (ref 3.5–5)
PROT SERPL-MCNC: 6.8 G/DL (ref 6–8)
RBC # BLD AUTO: 4.61 10E6/UL (ref 3.8–5.2)
SODIUM SERPL-SCNC: 143 MMOL/L (ref 136–145)
TRIGL SERPL-MCNC: 137 MG/DL
VIT B12 SERPL-MCNC: 410 PG/ML (ref 213–816)
WBC # BLD AUTO: 10 10E3/UL (ref 4–11)

## 2022-04-28 PROCEDURE — 82746 ASSAY OF FOLIC ACID SERUM: CPT | Performed by: FAMILY MEDICINE

## 2022-04-28 PROCEDURE — 80053 COMPREHEN METABOLIC PANEL: CPT | Performed by: FAMILY MEDICINE

## 2022-04-28 PROCEDURE — 80061 LIPID PANEL: CPT | Performed by: FAMILY MEDICINE

## 2022-04-28 PROCEDURE — 82607 VITAMIN B-12: CPT | Performed by: FAMILY MEDICINE

## 2022-04-28 PROCEDURE — 85027 COMPLETE CBC AUTOMATED: CPT | Performed by: FAMILY MEDICINE

## 2022-07-23 ENCOUNTER — HEALTH MAINTENANCE LETTER (OUTPATIENT)
Age: 72
End: 2022-07-23

## 2022-10-01 ENCOUNTER — HEALTH MAINTENANCE LETTER (OUTPATIENT)
Age: 72
End: 2022-10-01

## 2023-04-21 ENCOUNTER — LAB REQUISITION (OUTPATIENT)
Dept: LAB | Facility: CLINIC | Age: 73
End: 2023-04-21

## 2023-04-21 DIAGNOSIS — E78.2 MIXED HYPERLIPIDEMIA: ICD-10-CM

## 2023-04-21 DIAGNOSIS — E53.8 DEFICIENCY OF OTHER SPECIFIED B GROUP VITAMINS: ICD-10-CM

## 2023-04-21 DIAGNOSIS — M85.80 OTHER SPECIFIED DISORDERS OF BONE DENSITY AND STRUCTURE, UNSPECIFIED SITE: ICD-10-CM

## 2023-04-21 PROCEDURE — 80061 LIPID PANEL: CPT | Performed by: FAMILY MEDICINE

## 2023-04-21 PROCEDURE — 82607 VITAMIN B-12: CPT | Performed by: FAMILY MEDICINE

## 2023-04-21 PROCEDURE — 82306 VITAMIN D 25 HYDROXY: CPT | Performed by: FAMILY MEDICINE

## 2023-04-21 PROCEDURE — 80053 COMPREHEN METABOLIC PANEL: CPT | Performed by: FAMILY MEDICINE

## 2023-04-22 LAB
ALBUMIN SERPL BCG-MCNC: 4.6 G/DL (ref 3.5–5.2)
ALP SERPL-CCNC: 92 U/L (ref 35–104)
ALT SERPL W P-5'-P-CCNC: 18 U/L (ref 10–35)
ANION GAP SERPL CALCULATED.3IONS-SCNC: 20 MMOL/L (ref 7–15)
AST SERPL W P-5'-P-CCNC: 20 U/L (ref 10–35)
BILIRUB SERPL-MCNC: 0.7 MG/DL
BUN SERPL-MCNC: 18.6 MG/DL (ref 8–23)
CALCIUM SERPL-MCNC: 9.8 MG/DL (ref 8.8–10.2)
CHLORIDE SERPL-SCNC: 99 MMOL/L (ref 98–107)
CHOLEST SERPL-MCNC: 183 MG/DL
CREAT SERPL-MCNC: 0.64 MG/DL (ref 0.51–0.95)
DEPRECATED CALCIDIOL+CALCIFEROL SERPL-MC: 64 UG/L (ref 20–75)
DEPRECATED HCO3 PLAS-SCNC: 23 MMOL/L (ref 22–29)
GFR SERPL CREATININE-BSD FRML MDRD: >90 ML/MIN/1.73M2
GLUCOSE SERPL-MCNC: 96 MG/DL (ref 70–99)
HDLC SERPL-MCNC: 66 MG/DL
LDLC SERPL CALC-MCNC: 86 MG/DL
NONHDLC SERPL-MCNC: 117 MG/DL
POTASSIUM SERPL-SCNC: 4.1 MMOL/L (ref 3.4–5.3)
PROT SERPL-MCNC: 7.4 G/DL (ref 6.4–8.3)
SODIUM SERPL-SCNC: 142 MMOL/L (ref 136–145)
TRIGL SERPL-MCNC: 153 MG/DL
VIT B12 SERPL-MCNC: 2762 PG/ML (ref 232–1245)

## 2023-07-11 ENCOUNTER — LAB REQUISITION (OUTPATIENT)
Dept: LAB | Facility: CLINIC | Age: 73
End: 2023-07-11

## 2023-07-11 DIAGNOSIS — N32.89 OTHER SPECIFIED DISORDERS OF BLADDER: ICD-10-CM

## 2023-07-11 PROCEDURE — 87186 SC STD MICRODIL/AGAR DIL: CPT | Performed by: PHYSICIAN ASSISTANT

## 2023-07-13 LAB — BACTERIA UR CULT: ABNORMAL

## 2023-08-06 ENCOUNTER — HEALTH MAINTENANCE LETTER (OUTPATIENT)
Age: 73
End: 2023-08-06

## 2024-05-02 ENCOUNTER — LAB REQUISITION (OUTPATIENT)
Dept: LAB | Facility: CLINIC | Age: 74
End: 2024-05-02

## 2024-05-02 DIAGNOSIS — E53.8 DEFICIENCY OF OTHER SPECIFIED B GROUP VITAMINS: ICD-10-CM

## 2024-05-02 DIAGNOSIS — M85.80 OTHER SPECIFIED DISORDERS OF BONE DENSITY AND STRUCTURE, UNSPECIFIED SITE: ICD-10-CM

## 2024-05-02 DIAGNOSIS — E78.2 MIXED HYPERLIPIDEMIA: ICD-10-CM

## 2024-05-02 PROCEDURE — 82607 VITAMIN B-12: CPT | Performed by: FAMILY MEDICINE

## 2024-05-02 PROCEDURE — 82306 VITAMIN D 25 HYDROXY: CPT | Performed by: FAMILY MEDICINE

## 2024-05-02 PROCEDURE — 80061 LIPID PANEL: CPT | Performed by: FAMILY MEDICINE

## 2024-05-02 PROCEDURE — 80053 COMPREHEN METABOLIC PANEL: CPT | Performed by: FAMILY MEDICINE

## 2024-05-03 LAB
CHOLEST SERPL-MCNC: 174 MG/DL
FASTING STATUS PATIENT QL REPORTED: NORMAL
HDLC SERPL-MCNC: 61 MG/DL
LDLC SERPL CALC-MCNC: 85 MG/DL
NONHDLC SERPL-MCNC: 113 MG/DL
TRIGL SERPL-MCNC: 139 MG/DL

## 2024-05-04 LAB
ALBUMIN SERPL BCG-MCNC: 4.4 G/DL (ref 3.5–5.2)
ALP SERPL-CCNC: 89 U/L (ref 40–150)
ALT SERPL W P-5'-P-CCNC: 17 U/L (ref 0–50)
ANION GAP SERPL CALCULATED.3IONS-SCNC: 13 MMOL/L (ref 7–15)
AST SERPL W P-5'-P-CCNC: 20 U/L (ref 0–45)
BILIRUB SERPL-MCNC: 0.6 MG/DL
BUN SERPL-MCNC: 17.9 MG/DL (ref 8–23)
CALCIUM SERPL-MCNC: 9.4 MG/DL (ref 8.8–10.2)
CHLORIDE SERPL-SCNC: 102 MMOL/L (ref 98–107)
CREAT SERPL-MCNC: 0.58 MG/DL (ref 0.51–0.95)
DEPRECATED HCO3 PLAS-SCNC: 26 MMOL/L (ref 22–29)
EGFRCR SERPLBLD CKD-EPI 2021: >90 ML/MIN/1.73M2
GLUCOSE SERPL-MCNC: 94 MG/DL (ref 70–99)
POTASSIUM SERPL-SCNC: 4.2 MMOL/L (ref 3.4–5.3)
PROT SERPL-MCNC: 7.2 G/DL (ref 6.4–8.3)
SODIUM SERPL-SCNC: 141 MMOL/L (ref 135–145)
VIT B12 SERPL-MCNC: >4000 PG/ML (ref 232–1245)
VIT D+METAB SERPL-MCNC: 43 NG/ML (ref 20–50)

## 2024-12-16 ENCOUNTER — TRANSFERRED RECORDS (OUTPATIENT)
Dept: HEALTH INFORMATION MANAGEMENT | Facility: CLINIC | Age: 74
End: 2024-12-16

## 2025-01-03 ENCOUNTER — LAB REQUISITION (OUTPATIENT)
Dept: LAB | Facility: CLINIC | Age: 75
End: 2025-01-03
Payer: COMMERCIAL

## 2025-01-03 DIAGNOSIS — Z01.818 ENCOUNTER FOR OTHER PREPROCEDURAL EXAMINATION: ICD-10-CM

## 2025-01-03 LAB
ANION GAP SERPL CALCULATED.3IONS-SCNC: 5 MMOL/L (ref 7–15)
BUN SERPL-MCNC: 21.4 MG/DL (ref 8–23)
CALCIUM SERPL-MCNC: 9.3 MG/DL (ref 8.8–10.4)
CHLORIDE SERPL-SCNC: 102 MMOL/L (ref 98–107)
CREAT SERPL-MCNC: 0.71 MG/DL (ref 0.51–0.95)
EGFRCR SERPLBLD CKD-EPI 2021: 88 ML/MIN/1.73M2
ERYTHROCYTE [DISTWIDTH] IN BLOOD BY AUTOMATED COUNT: 13.9 % (ref 10–15)
GLUCOSE SERPL-MCNC: 108 MG/DL (ref 70–99)
HCO3 SERPL-SCNC: 33 MMOL/L (ref 22–29)
HCT VFR BLD AUTO: 45.1 % (ref 35–47)
HGB BLD-MCNC: 14.4 G/DL (ref 11.7–15.7)
MCH RBC QN AUTO: 30.8 PG (ref 26.5–33)
MCHC RBC AUTO-ENTMCNC: 31.9 G/DL (ref 31.5–36.5)
MCV RBC AUTO: 96 FL (ref 78–100)
PLATELET # BLD AUTO: 255 10E3/UL (ref 150–450)
POTASSIUM SERPL-SCNC: 4.2 MMOL/L (ref 3.4–5.3)
RBC # BLD AUTO: 4.68 10E6/UL (ref 3.8–5.2)
SODIUM SERPL-SCNC: 140 MMOL/L (ref 135–145)
WBC # BLD AUTO: 9.2 10E3/UL (ref 4–11)

## 2025-01-03 PROCEDURE — 85027 COMPLETE CBC AUTOMATED: CPT | Mod: ORL | Performed by: FAMILY MEDICINE

## 2025-01-03 PROCEDURE — 80048 BASIC METABOLIC PNL TOTAL CA: CPT | Mod: ORL | Performed by: FAMILY MEDICINE

## 2025-01-15 RX ORDER — ESCITALOPRAM OXALATE 10 MG/1
10 TABLET ORAL AT BEDTIME
COMMUNITY

## 2025-01-15 RX ORDER — ALBUTEROL SULFATE 90 UG/1
2 INHALANT RESPIRATORY (INHALATION) EVERY 4 HOURS PRN
COMMUNITY

## 2025-01-15 RX ORDER — CELECOXIB 100 MG/1
100 CAPSULE ORAL DAILY PRN
Status: ON HOLD | COMMUNITY
End: 2025-01-28

## 2025-01-15 RX ORDER — CALCIUM CARBONATE/VITAMIN D3 600 MG-10
1 TABLET ORAL DAILY
COMMUNITY
End: 2025-01-20

## 2025-01-15 RX ORDER — LANOLIN ALCOHOL/MO/W.PET/CERES
1000 CREAM (GRAM) TOPICAL DAILY
COMMUNITY
End: 2025-01-20

## 2025-01-15 RX ORDER — ROSUVASTATIN CALCIUM 10 MG/1
10 TABLET, COATED ORAL AT BEDTIME
COMMUNITY

## 2025-01-15 RX ORDER — CYCLOBENZAPRINE HCL 5 MG
5 TABLET ORAL
COMMUNITY
End: 2025-01-20

## 2025-01-15 RX ORDER — LORAZEPAM 0.5 MG/1
0.5 TABLET ORAL EVERY 8 HOURS PRN
Status: ON HOLD | COMMUNITY
End: 2025-01-27

## 2025-01-15 RX ORDER — FAMOTIDINE 20 MG/1
20 TABLET, FILM COATED ORAL AT BEDTIME
COMMUNITY

## 2025-01-15 RX ORDER — VITAMIN B COMPLEX
4 TABLET ORAL DAILY
COMMUNITY
End: 2025-01-20

## 2025-01-20 RX ORDER — CHOLECALCIFEROL (VITAMIN D3) 50 MCG
1 TABLET ORAL DAILY
COMMUNITY

## 2025-01-20 RX ORDER — GUAIFENESIN 600 MG/1
600 TABLET, EXTENDED RELEASE ORAL 2 TIMES DAILY PRN
COMMUNITY

## 2025-01-20 NOTE — PROGRESS NOTES
Planning to discharge home on POD 1 in the morning with her  helping her.       01/20/25 1201   Discharge Planning   Patient/Family Anticipates Transition to home with family   Concerns to be Addressed all concerns addressed in this encounter   Living Arrangements   People in Home spouse   Type of Residence Private Residence   Is your private residence a single family home or apartment? Single family home   Number of Stairs, Within Home, Primary greater than 10 stairs   Stair Railings, Within Home, Primary railings safe and in good condition   Once home, are you able to live on one level? No   Which rooms are not on the main level? Bedroom   Bathroom Shower/Tub Walk-in shower   Equipment Currently Used at Home grab bar, tub/shower;grab bar, toilet;raised toilet seat   Support System   Support Systems Spouse/Significant Other  (, Gary)   Do you have someone available to stay with you one or two nights once you are home? Yes   Education   Patient attended total joint pre-op class/received pre-op teaching  email/phone call

## 2025-01-24 ENCOUNTER — ANESTHESIA EVENT (OUTPATIENT)
Dept: SURGERY | Facility: CLINIC | Age: 75
End: 2025-01-24
Payer: COMMERCIAL

## 2025-01-27 ENCOUNTER — APPOINTMENT (OUTPATIENT)
Dept: PHYSICAL THERAPY | Facility: CLINIC | Age: 75
End: 2025-01-27
Attending: ORTHOPAEDIC SURGERY
Payer: COMMERCIAL

## 2025-01-27 ENCOUNTER — ANESTHESIA (OUTPATIENT)
Dept: SURGERY | Facility: CLINIC | Age: 75
End: 2025-01-27
Payer: COMMERCIAL

## 2025-01-27 ENCOUNTER — APPOINTMENT (OUTPATIENT)
Dept: RADIOLOGY | Facility: CLINIC | Age: 75
End: 2025-01-27
Attending: ORTHOPAEDIC SURGERY
Payer: COMMERCIAL

## 2025-01-27 ENCOUNTER — HOSPITAL ENCOUNTER (OUTPATIENT)
Facility: CLINIC | Age: 75
Discharge: HOME | End: 2025-01-29
Attending: ORTHOPAEDIC SURGERY | Admitting: ORTHOPAEDIC SURGERY
Payer: COMMERCIAL

## 2025-01-27 DIAGNOSIS — R06.83 SNORING: ICD-10-CM

## 2025-01-27 DIAGNOSIS — J95.89 POSTOPERATIVE ATELECTASIS: ICD-10-CM

## 2025-01-27 DIAGNOSIS — J98.11 POSTOPERATIVE ATELECTASIS: ICD-10-CM

## 2025-01-27 DIAGNOSIS — Z96.641 STATUS POST TOTAL HIP REPLACEMENT, RIGHT: Primary | ICD-10-CM

## 2025-01-27 PROBLEM — M16.11 ARTHRITIS OF RIGHT HIP: Status: ACTIVE | Noted: 2025-01-27

## 2025-01-27 LAB
ERYTHROCYTE [DISTWIDTH] IN BLOOD BY AUTOMATED COUNT: 13.3 % (ref 10–15)
GLUCOSE BLDC GLUCOMTR-MCNC: 107 MG/DL (ref 70–99)
HCT VFR BLD AUTO: 41.4 % (ref 35–47)
HGB BLD-MCNC: 13.6 G/DL (ref 11.7–15.7)
MCH RBC QN AUTO: 30.6 PG (ref 26.5–33)
MCHC RBC AUTO-ENTMCNC: 32.9 G/DL (ref 31.5–36.5)
MCV RBC AUTO: 93 FL (ref 78–100)
PLATELET # BLD AUTO: 223 10E3/UL (ref 150–450)
RBC # BLD AUTO: 4.44 10E6/UL (ref 3.8–5.2)
WBC # BLD AUTO: 8.3 10E3/UL (ref 4–11)

## 2025-01-27 PROCEDURE — 250N000011 HC RX IP 250 OP 636: Performed by: NURSE ANESTHETIST, CERTIFIED REGISTERED

## 2025-01-27 PROCEDURE — 258N000003 HC RX IP 258 OP 636: Performed by: ANESTHESIOLOGY

## 2025-01-27 PROCEDURE — 710N000010 HC RECOVERY PHASE 1, LEVEL 2, PER MIN: Performed by: ORTHOPAEDIC SURGERY

## 2025-01-27 PROCEDURE — 250N000011 HC RX IP 250 OP 636: Performed by: PHYSICIAN ASSISTANT

## 2025-01-27 PROCEDURE — 99202 OFFICE O/P NEW SF 15 MIN: CPT | Performed by: HOSPITALIST

## 2025-01-27 PROCEDURE — 250N000013 HC RX MED GY IP 250 OP 250 PS 637: Performed by: ORTHOPAEDIC SURGERY

## 2025-01-27 PROCEDURE — 36415 COLL VENOUS BLD VENIPUNCTURE: CPT | Performed by: PHYSICIAN ASSISTANT

## 2025-01-27 PROCEDURE — 360N000077 HC SURGERY LEVEL 4, PER MIN: Performed by: ORTHOPAEDIC SURGERY

## 2025-01-27 PROCEDURE — 258N000003 HC RX IP 258 OP 636: Performed by: ORTHOPAEDIC SURGERY

## 2025-01-27 PROCEDURE — 85027 COMPLETE CBC AUTOMATED: CPT | Performed by: PHYSICIAN ASSISTANT

## 2025-01-27 PROCEDURE — 97161 PT EVAL LOW COMPLEX 20 MIN: CPT | Mod: GP

## 2025-01-27 PROCEDURE — 250N000009 HC RX 250: Performed by: ORTHOPAEDIC SURGERY

## 2025-01-27 PROCEDURE — 82962 GLUCOSE BLOOD TEST: CPT

## 2025-01-27 PROCEDURE — 272N000001 HC OR GENERAL SUPPLY STERILE: Performed by: ORTHOPAEDIC SURGERY

## 2025-01-27 PROCEDURE — 97116 GAIT TRAINING THERAPY: CPT | Mod: GP

## 2025-01-27 PROCEDURE — 250N000011 HC RX IP 250 OP 636: Mod: JZ | Performed by: ORTHOPAEDIC SURGERY

## 2025-01-27 PROCEDURE — 250N000013 HC RX MED GY IP 250 OP 250 PS 637: Performed by: HOSPITALIST

## 2025-01-27 PROCEDURE — 250N000009 HC RX 250: Performed by: NURSE ANESTHETIST, CERTIFIED REGISTERED

## 2025-01-27 PROCEDURE — 97110 THERAPEUTIC EXERCISES: CPT | Mod: GP

## 2025-01-27 PROCEDURE — 999N000141 HC STATISTIC PRE-PROCEDURE NURSING ASSESSMENT: Performed by: ORTHOPAEDIC SURGERY

## 2025-01-27 PROCEDURE — 999N000065 XR PELVIS PORT 1/2 VIEWS

## 2025-01-27 PROCEDURE — 250N000011 HC RX IP 250 OP 636: Performed by: ANESTHESIOLOGY

## 2025-01-27 PROCEDURE — C1776 JOINT DEVICE (IMPLANTABLE): HCPCS | Performed by: ORTHOPAEDIC SURGERY

## 2025-01-27 PROCEDURE — 370N000017 HC ANESTHESIA TECHNICAL FEE, PER MIN: Performed by: ORTHOPAEDIC SURGERY

## 2025-01-27 PROCEDURE — 250N000013 HC RX MED GY IP 250 OP 250 PS 637: Performed by: PHYSICIAN ASSISTANT

## 2025-01-27 PROCEDURE — 250N000025 HC SEVOFLURANE, PER MIN: Performed by: ORTHOPAEDIC SURGERY

## 2025-01-27 PROCEDURE — C1713 ANCHOR/SCREW BN/BN,TIS/BN: HCPCS | Performed by: ORTHOPAEDIC SURGERY

## 2025-01-27 PROCEDURE — 999N000063 XR HIP PORT RIGHT 1 VIEW

## 2025-01-27 DEVICE — 6.5MM LOW PROFILE HEX SCREW 15MM
Type: IMPLANTABLE DEVICE | Site: HIP | Status: FUNCTIONAL
Brand: TRIDENT II

## 2025-01-27 DEVICE — CERAMIC V40 FEMORAL HEAD
Type: IMPLANTABLE DEVICE | Site: HIP | Status: FUNCTIONAL
Brand: BIOLOX

## 2025-01-27 DEVICE — TRIDENT II PSL CLUSTERHOLE HA ACETABULAR SHELL 50D
Type: IMPLANTABLE DEVICE | Site: HIP | Status: FUNCTIONAL
Brand: TRIDENT II

## 2025-01-27 DEVICE — TRIDENT X3 10 DEGREE POLYETHYLENE INSERT
Type: IMPLANTABLE DEVICE | Site: HIP | Status: FUNCTIONAL
Brand: TRIDENT X3 INSERT

## 2025-01-27 DEVICE — 6.5MM LOW PROFILE HEX SCREW 25MM
Type: IMPLANTABLE DEVICE | Site: HIP | Status: FUNCTIONAL
Brand: TRIDENT II

## 2025-01-27 DEVICE — ACETABULAR PIN: Type: IMPLANTABLE DEVICE | Site: HIP | Status: FUNCTIONAL

## 2025-01-27 DEVICE — HIP STEM - HIGH OFFSET
Type: IMPLANTABLE DEVICE | Site: HIP | Status: FUNCTIONAL
Brand: INSIGNIA

## 2025-01-27 RX ORDER — FAMOTIDINE 20 MG/1
20 TABLET, FILM COATED ORAL AT BEDTIME
Status: DISCONTINUED | OUTPATIENT
Start: 2025-01-27 | End: 2025-01-27 | Stop reason: DRUGHIGH

## 2025-01-27 RX ORDER — LANOLIN ALCOHOL/MO/W.PET/CERES
1000 CREAM (GRAM) TOPICAL DAILY
COMMUNITY

## 2025-01-27 RX ORDER — METHOCARBAMOL 500 MG/1
500 TABLET, FILM COATED ORAL 4 TIMES DAILY PRN
Qty: 60 TABLET | Refills: 1 | Status: SHIPPED | OUTPATIENT
Start: 2025-01-27

## 2025-01-27 RX ORDER — LIDOCAINE 40 MG/G
CREAM TOPICAL
Status: DISCONTINUED | OUTPATIENT
Start: 2025-01-27 | End: 2025-01-29 | Stop reason: HOSPADM

## 2025-01-27 RX ORDER — ROSUVASTATIN CALCIUM 10 MG/1
10 TABLET, COATED ORAL AT BEDTIME
Status: DISCONTINUED | OUTPATIENT
Start: 2025-01-27 | End: 2025-01-29 | Stop reason: HOSPADM

## 2025-01-27 RX ORDER — FENTANYL CITRATE 50 UG/ML
50 INJECTION, SOLUTION INTRAMUSCULAR; INTRAVENOUS EVERY 5 MIN PRN
Status: DISCONTINUED | OUTPATIENT
Start: 2025-01-27 | End: 2025-01-27 | Stop reason: HOSPADM

## 2025-01-27 RX ORDER — NALOXONE HYDROCHLORIDE 0.4 MG/ML
0.2 INJECTION, SOLUTION INTRAMUSCULAR; INTRAVENOUS; SUBCUTANEOUS
Status: DISCONTINUED | OUTPATIENT
Start: 2025-01-27 | End: 2025-01-29 | Stop reason: HOSPADM

## 2025-01-27 RX ORDER — ASPIRIN 325 MG
325 TABLET, DELAYED RELEASE (ENTERIC COATED) ORAL DAILY
Status: DISCONTINUED | OUTPATIENT
Start: 2025-01-27 | End: 2025-01-28

## 2025-01-27 RX ORDER — NALOXONE HYDROCHLORIDE 0.4 MG/ML
0.4 INJECTION, SOLUTION INTRAMUSCULAR; INTRAVENOUS; SUBCUTANEOUS
Status: DISCONTINUED | OUTPATIENT
Start: 2025-01-27 | End: 2025-01-29 | Stop reason: HOSPADM

## 2025-01-27 RX ORDER — BISACODYL 10 MG
10 SUPPOSITORY, RECTAL RECTAL DAILY PRN
Status: DISCONTINUED | OUTPATIENT
Start: 2025-01-27 | End: 2025-01-29 | Stop reason: HOSPADM

## 2025-01-27 RX ORDER — HYDROMORPHONE HCL IN WATER/PF 6 MG/30 ML
0.2 PATIENT CONTROLLED ANALGESIA SYRINGE INTRAVENOUS
Status: DISCONTINUED | OUTPATIENT
Start: 2025-01-27 | End: 2025-01-29 | Stop reason: HOSPADM

## 2025-01-27 RX ORDER — ACETAMINOPHEN 500 MG
500-1000 TABLET ORAL EVERY 6 HOURS PRN
COMMUNITY

## 2025-01-27 RX ORDER — HYDROMORPHONE HCL IN WATER/PF 6 MG/30 ML
0.4 PATIENT CONTROLLED ANALGESIA SYRINGE INTRAVENOUS
Status: DISCONTINUED | OUTPATIENT
Start: 2025-01-27 | End: 2025-01-29 | Stop reason: HOSPADM

## 2025-01-27 RX ORDER — HYDROMORPHONE HCL IN WATER/PF 6 MG/30 ML
0.2 PATIENT CONTROLLED ANALGESIA SYRINGE INTRAVENOUS EVERY 5 MIN PRN
Status: DISCONTINUED | OUTPATIENT
Start: 2025-01-27 | End: 2025-01-27 | Stop reason: HOSPADM

## 2025-01-27 RX ORDER — POLYETHYLENE GLYCOL 3350 17 G/17G
17 POWDER, FOR SOLUTION ORAL DAILY
Status: DISCONTINUED | OUTPATIENT
Start: 2025-01-28 | End: 2025-01-29 | Stop reason: HOSPADM

## 2025-01-27 RX ORDER — METHOCARBAMOL 500 MG/1
250 TABLET ORAL EVERY 6 HOURS PRN
Status: DISCONTINUED | OUTPATIENT
Start: 2025-01-27 | End: 2025-01-29 | Stop reason: HOSPADM

## 2025-01-27 RX ORDER — ACETAMINOPHEN 325 MG/1
975 TABLET ORAL EVERY 8 HOURS
Status: DISCONTINUED | OUTPATIENT
Start: 2025-01-27 | End: 2025-01-29 | Stop reason: HOSPADM

## 2025-01-27 RX ORDER — FENTANYL CITRATE 50 UG/ML
INJECTION, SOLUTION INTRAMUSCULAR; INTRAVENOUS PRN
Status: DISCONTINUED | OUTPATIENT
Start: 2025-01-27 | End: 2025-01-27

## 2025-01-27 RX ORDER — OXYCODONE HYDROCHLORIDE 5 MG/1
10 TABLET ORAL EVERY 4 HOURS PRN
Status: DISCONTINUED | OUTPATIENT
Start: 2025-01-27 | End: 2025-01-28

## 2025-01-27 RX ORDER — AMOXICILLIN 250 MG
1-2 CAPSULE ORAL 2 TIMES DAILY
Status: SHIPPED
Start: 2025-01-27

## 2025-01-27 RX ORDER — ONDANSETRON 2 MG/ML
4 INJECTION INTRAMUSCULAR; INTRAVENOUS EVERY 6 HOURS PRN
Status: DISCONTINUED | OUTPATIENT
Start: 2025-01-27 | End: 2025-01-29 | Stop reason: HOSPADM

## 2025-01-27 RX ORDER — TRAMADOL HYDROCHLORIDE 50 MG/1
50 TABLET ORAL EVERY 6 HOURS PRN
Qty: 30 TABLET | Refills: 0 | Status: SHIPPED | OUTPATIENT
Start: 2025-01-27

## 2025-01-27 RX ORDER — SODIUM CHLORIDE, SODIUM LACTATE, POTASSIUM CHLORIDE, CALCIUM CHLORIDE 600; 310; 30; 20 MG/100ML; MG/100ML; MG/100ML; MG/100ML
INJECTION, SOLUTION INTRAVENOUS CONTINUOUS
Status: DISCONTINUED | OUTPATIENT
Start: 2025-01-27 | End: 2025-01-27 | Stop reason: HOSPADM

## 2025-01-27 RX ORDER — PROPOFOL 10 MG/ML
INJECTION, EMULSION INTRAVENOUS CONTINUOUS PRN
Status: DISCONTINUED | OUTPATIENT
Start: 2025-01-27 | End: 2025-01-27

## 2025-01-27 RX ORDER — TRANEXAMIC ACID 650 MG/1
1950 TABLET ORAL ONCE
Status: COMPLETED | OUTPATIENT
Start: 2025-01-27 | End: 2025-01-27

## 2025-01-27 RX ORDER — CEFAZOLIN SODIUM/WATER 2 G/20 ML
2 SYRINGE (ML) INTRAVENOUS SEE ADMIN INSTRUCTIONS
Status: DISCONTINUED | OUTPATIENT
Start: 2025-01-27 | End: 2025-01-27 | Stop reason: HOSPADM

## 2025-01-27 RX ORDER — DEXMEDETOMIDINE HYDROCHLORIDE 4 UG/ML
INJECTION, SOLUTION INTRAVENOUS PRN
Status: DISCONTINUED | OUTPATIENT
Start: 2025-01-27 | End: 2025-01-27

## 2025-01-27 RX ORDER — ESCITALOPRAM OXALATE 10 MG/1
10 TABLET ORAL AT BEDTIME
Status: DISCONTINUED | OUTPATIENT
Start: 2025-01-27 | End: 2025-01-29 | Stop reason: HOSPADM

## 2025-01-27 RX ORDER — DEXAMETHASONE SODIUM PHOSPHATE 4 MG/ML
4 INJECTION, SOLUTION INTRA-ARTICULAR; INTRALESIONAL; INTRAMUSCULAR; INTRAVENOUS; SOFT TISSUE
Status: DISCONTINUED | OUTPATIENT
Start: 2025-01-27 | End: 2025-01-27 | Stop reason: HOSPADM

## 2025-01-27 RX ORDER — CEFAZOLIN SODIUM/WATER 2 G/20 ML
2 SYRINGE (ML) INTRAVENOUS
Status: COMPLETED | OUTPATIENT
Start: 2025-01-27 | End: 2025-01-27

## 2025-01-27 RX ORDER — AMOXICILLIN 250 MG
1 CAPSULE ORAL 2 TIMES DAILY
Status: DISCONTINUED | OUTPATIENT
Start: 2025-01-27 | End: 2025-01-29 | Stop reason: HOSPADM

## 2025-01-27 RX ORDER — NALOXONE HYDROCHLORIDE 0.4 MG/ML
0.1 INJECTION, SOLUTION INTRAMUSCULAR; INTRAVENOUS; SUBCUTANEOUS
Status: DISCONTINUED | OUTPATIENT
Start: 2025-01-27 | End: 2025-01-27 | Stop reason: HOSPADM

## 2025-01-27 RX ORDER — DEXAMETHASONE SODIUM PHOSPHATE 10 MG/ML
INJECTION, SOLUTION INTRAMUSCULAR; INTRAVENOUS PRN
Status: DISCONTINUED | OUTPATIENT
Start: 2025-01-27 | End: 2025-01-27

## 2025-01-27 RX ORDER — LIDOCAINE 40 MG/G
CREAM TOPICAL
Status: DISCONTINUED | OUTPATIENT
Start: 2025-01-27 | End: 2025-01-27 | Stop reason: HOSPADM

## 2025-01-27 RX ORDER — ONDANSETRON 4 MG/1
4 TABLET, ORALLY DISINTEGRATING ORAL EVERY 30 MIN PRN
Status: DISCONTINUED | OUTPATIENT
Start: 2025-01-27 | End: 2025-01-27 | Stop reason: HOSPADM

## 2025-01-27 RX ORDER — PROCHLORPERAZINE MALEATE 5 MG/1
5 TABLET ORAL EVERY 6 HOURS PRN
Status: DISCONTINUED | OUTPATIENT
Start: 2025-01-27 | End: 2025-01-29 | Stop reason: HOSPADM

## 2025-01-27 RX ORDER — IBUPROFEN 200 MG
400-800 TABLET ORAL EVERY 6 HOURS PRN
COMMUNITY

## 2025-01-27 RX ORDER — LIDOCAINE HYDROCHLORIDE 10 MG/ML
INJECTION, SOLUTION INFILTRATION; PERINEURAL PRN
Status: DISCONTINUED | OUTPATIENT
Start: 2025-01-27 | End: 2025-01-27

## 2025-01-27 RX ORDER — HYDROMORPHONE HCL IN WATER/PF 6 MG/30 ML
0.4 PATIENT CONTROLLED ANALGESIA SYRINGE INTRAVENOUS EVERY 5 MIN PRN
Status: DISCONTINUED | OUTPATIENT
Start: 2025-01-27 | End: 2025-01-27 | Stop reason: HOSPADM

## 2025-01-27 RX ORDER — FAMOTIDINE 20 MG/1
20 TABLET, FILM COATED ORAL 2 TIMES DAILY
Status: DISCONTINUED | OUTPATIENT
Start: 2025-01-27 | End: 2025-01-29 | Stop reason: HOSPADM

## 2025-01-27 RX ORDER — ASPIRIN 325 MG
325 TABLET, DELAYED RELEASE (ENTERIC COATED) ORAL DAILY
Status: SHIPPED
Start: 2025-01-27 | End: 2025-01-28

## 2025-01-27 RX ORDER — OXYCODONE HYDROCHLORIDE 5 MG/1
5 TABLET ORAL EVERY 4 HOURS PRN
Status: DISCONTINUED | OUTPATIENT
Start: 2025-01-27 | End: 2025-01-28

## 2025-01-27 RX ORDER — ACETAMINOPHEN 325 MG/1
650 TABLET ORAL EVERY 4 HOURS PRN
Status: SHIPPED
Start: 2025-01-27 | End: 2025-01-28

## 2025-01-27 RX ORDER — ACETAMINOPHEN 325 MG/1
975 TABLET ORAL ONCE
Status: COMPLETED | OUTPATIENT
Start: 2025-01-27 | End: 2025-01-27

## 2025-01-27 RX ORDER — ONDANSETRON 2 MG/ML
INJECTION INTRAMUSCULAR; INTRAVENOUS PRN
Status: DISCONTINUED | OUTPATIENT
Start: 2025-01-27 | End: 2025-01-27

## 2025-01-27 RX ORDER — SODIUM CHLORIDE, SODIUM LACTATE, POTASSIUM CHLORIDE, CALCIUM CHLORIDE 600; 310; 30; 20 MG/100ML; MG/100ML; MG/100ML; MG/100ML
INJECTION, SOLUTION INTRAVENOUS CONTINUOUS
Status: DISCONTINUED | OUTPATIENT
Start: 2025-01-27 | End: 2025-01-29 | Stop reason: HOSPADM

## 2025-01-27 RX ORDER — CEFAZOLIN SODIUM 2 G/100ML
2 INJECTION, SOLUTION INTRAVENOUS EVERY 8 HOURS
Status: COMPLETED | OUTPATIENT
Start: 2025-01-27 | End: 2025-01-28

## 2025-01-27 RX ORDER — FENTANYL CITRATE 50 UG/ML
25 INJECTION, SOLUTION INTRAMUSCULAR; INTRAVENOUS EVERY 5 MIN PRN
Status: DISCONTINUED | OUTPATIENT
Start: 2025-01-27 | End: 2025-01-27 | Stop reason: HOSPADM

## 2025-01-27 RX ORDER — ONDANSETRON 4 MG/1
4 TABLET, ORALLY DISINTEGRATING ORAL EVERY 6 HOURS PRN
Status: DISCONTINUED | OUTPATIENT
Start: 2025-01-27 | End: 2025-01-29 | Stop reason: HOSPADM

## 2025-01-27 RX ORDER — DIPHENHYDRAMINE HCL 12.5 MG/5ML
12.5 SOLUTION ORAL EVERY 6 HOURS PRN
Status: DISCONTINUED | OUTPATIENT
Start: 2025-01-27 | End: 2025-01-29 | Stop reason: HOSPADM

## 2025-01-27 RX ORDER — ONDANSETRON 2 MG/ML
4 INJECTION INTRAMUSCULAR; INTRAVENOUS EVERY 30 MIN PRN
Status: DISCONTINUED | OUTPATIENT
Start: 2025-01-27 | End: 2025-01-27 | Stop reason: HOSPADM

## 2025-01-27 RX ADMIN — METHOCARBAMOL 250 MG: 500 TABLET ORAL at 14:45

## 2025-01-27 RX ADMIN — LIDOCAINE HYDROCHLORIDE 50 MG: 10 INJECTION, SOLUTION INFILTRATION; PERINEURAL at 09:48

## 2025-01-27 RX ADMIN — FENTANYL CITRATE 25 MCG: 50 INJECTION INTRAMUSCULAR; INTRAVENOUS at 12:17

## 2025-01-27 RX ADMIN — ACETAMINOPHEN 975 MG: 325 TABLET ORAL at 08:43

## 2025-01-27 RX ADMIN — Medication 2 G: at 09:56

## 2025-01-27 RX ADMIN — FENTANYL CITRATE 50 MCG: 50 INJECTION INTRAMUSCULAR; INTRAVENOUS at 10:42

## 2025-01-27 RX ADMIN — ROSUVASTATIN CALCIUM 10 MG: 10 TABLET, FILM COATED ORAL at 20:31

## 2025-01-27 RX ADMIN — DEXMEDETOMIDINE 8 MCG: 100 INJECTION, SOLUTION, CONCENTRATE INTRAVENOUS at 10:38

## 2025-01-27 RX ADMIN — FENTANYL CITRATE 50 MCG: 50 INJECTION INTRAMUSCULAR; INTRAVENOUS at 10:37

## 2025-01-27 RX ADMIN — ONDANSETRON 4 MG: 2 INJECTION INTRAMUSCULAR; INTRAVENOUS at 11:21

## 2025-01-27 RX ADMIN — FENTANYL CITRATE 25 MCG: 50 INJECTION INTRAMUSCULAR; INTRAVENOUS at 12:23

## 2025-01-27 RX ADMIN — FAMOTIDINE 20 MG: 20 TABLET, FILM COATED ORAL at 20:31

## 2025-01-27 RX ADMIN — HYDROMORPHONE HYDROCHLORIDE 0.5 MG: 1 INJECTION, SOLUTION INTRAMUSCULAR; INTRAVENOUS; SUBCUTANEOUS at 10:10

## 2025-01-27 RX ADMIN — MIDAZOLAM 1 MG: 1 INJECTION INTRAMUSCULAR; INTRAVENOUS at 09:40

## 2025-01-27 RX ADMIN — HYDROMORPHONE HYDROCHLORIDE 0.5 MG: 1 INJECTION, SOLUTION INTRAMUSCULAR; INTRAVENOUS; SUBCUTANEOUS at 10:05

## 2025-01-27 RX ADMIN — Medication 200 MG: at 11:36

## 2025-01-27 RX ADMIN — OXYCODONE 5 MG: 5 TABLET ORAL at 13:31

## 2025-01-27 RX ADMIN — SODIUM CHLORIDE, POTASSIUM CHLORIDE, SODIUM LACTATE AND CALCIUM CHLORIDE: 600; 310; 30; 20 INJECTION, SOLUTION INTRAVENOUS at 19:22

## 2025-01-27 RX ADMIN — DEXMEDETOMIDINE 8 MCG: 100 INJECTION, SOLUTION, CONCENTRATE INTRAVENOUS at 10:08

## 2025-01-27 RX ADMIN — FENTANYL CITRATE 100 MCG: 50 INJECTION INTRAMUSCULAR; INTRAVENOUS at 09:48

## 2025-01-27 RX ADMIN — ROCURONIUM BROMIDE 40 MG: 10 INJECTION, SOLUTION INTRAVENOUS at 09:49

## 2025-01-27 RX ADMIN — ACETAMINOPHEN 975 MG: 325 TABLET ORAL at 17:44

## 2025-01-27 RX ADMIN — ROCURONIUM BROMIDE 10 MG: 10 INJECTION, SOLUTION INTRAVENOUS at 11:00

## 2025-01-27 RX ADMIN — ESCITALOPRAM OXALATE 10 MG: 10 TABLET ORAL at 20:31

## 2025-01-27 RX ADMIN — TRANEXAMIC ACID 1950 MG: 650 TABLET ORAL at 08:43

## 2025-01-27 RX ADMIN — SODIUM CHLORIDE, POTASSIUM CHLORIDE, SODIUM LACTATE AND CALCIUM CHLORIDE: 600; 310; 30; 20 INJECTION, SOLUTION INTRAVENOUS at 08:45

## 2025-01-27 RX ADMIN — PROPOFOL 50 MCG/KG/MIN: 10 INJECTION, EMULSION INTRAVENOUS at 10:09

## 2025-01-27 RX ADMIN — FENTANYL CITRATE 50 MCG: 50 INJECTION INTRAMUSCULAR; INTRAVENOUS at 12:09

## 2025-01-27 RX ADMIN — DEXAMETHASONE SODIUM PHOSPHATE 10 MG: 10 INJECTION, SOLUTION INTRAMUSCULAR; INTRAVENOUS at 09:48

## 2025-01-27 RX ADMIN — CEFAZOLIN SODIUM 2 G: 2 INJECTION, SOLUTION INTRAVENOUS at 17:44

## 2025-01-27 RX ADMIN — SENNOSIDES AND DOCUSATE SODIUM 1 TABLET: 50; 8.6 TABLET ORAL at 20:30

## 2025-01-27 ASSESSMENT — ACTIVITIES OF DAILY LIVING (ADL)
ADLS_ACUITY_SCORE: 32
ADLS_ACUITY_SCORE: 32
ADLS_ACUITY_SCORE: 34
ADLS_ACUITY_SCORE: 43
ADLS_ACUITY_SCORE: 47
ADLS_ACUITY_SCORE: 34
ADLS_ACUITY_SCORE: 32
ADLS_ACUITY_SCORE: 47
ADLS_ACUITY_SCORE: 43
ADLS_ACUITY_SCORE: 42
ADLS_ACUITY_SCORE: 32
ADLS_ACUITY_SCORE: 34
ADLS_ACUITY_SCORE: 32
ADLS_ACUITY_SCORE: 47
ADLS_ACUITY_SCORE: 34
ADLS_ACUITY_SCORE: 34

## 2025-01-27 NOTE — PHARMACY-ADMISSION MEDICATION HISTORY
Pharmacist Admission Medication History    Admission medication history is complete. The information provided in this note is only as accurate as the sources available at the time of the update.    Information Source(s): Patient and CareEverywhere/SureScripts via in-person    Pertinent Information: n/a    Allergies reviewed with patient and updates made in EHR: yes    Medication History Completed By: Marisol Gautam PharmD 1/27/2025 8:26 AM    PTA Med List   Medication Sig Note Last Dose/Taking    acetaminophen (TYLENOL) 500 MG tablet Take 500-1,000 mg by mouth every 6 hours as needed for mild pain.  1/26/2025    albuterol (PROAIR HFA/PROVENTIL HFA/VENTOLIN HFA) 108 (90 Base) MCG/ACT inhaler Inhale 2 puffs into the lungs every 4 hours as needed for cough. 1/27/2025: Not with More than a month    celecoxib (CELEBREX) 100 MG capsule Take 100 mg by mouth daily as needed. Stopping 1/20/25 before surgery  1/19/2025    cyanocobalamin (VITAMIN B-12) 1000 MCG tablet Take 1,000 mcg by mouth daily. 1/27/2025: Takes when she remembers, stopped taking OTCs after her fall More than a month    escitalopram (LEXAPRO) 10 MG tablet Take 10 mg by mouth at bedtime.  1/26/2025 Bedtime    famotidine (PEPCID) 20 MG tablet Take 20 mg by mouth at bedtime.  1/26/2025 Bedtime    guaiFENesin (MUCINEX) 600 MG 12 hr tablet Take 600 mg by mouth 2 times daily as needed for congestion.  1/23/2025    ibuprofen (ADVIL/MOTRIN) 200 MG tablet Take 400-800 mg by mouth every 6 hours as needed for pain.  1/19/2025    rosuvastatin (CRESTOR) 10 MG tablet Take 10 mg by mouth at bedtime.  1/26/2025 Bedtime    vitamin D3 (CHOLECALCIFEROL) 50 mcg (2000 units) tablet Take 1 tablet by mouth daily. 1/27/2025: Takes when she remembers, stopped taking OTCs after her fall More than a month

## 2025-01-27 NOTE — ANESTHESIA CARE TRANSFER NOTE
Patient: Olesya Stephen    Procedure: Procedure(s):  RIGHT TOTAL HIP ARTHROPLASTY       Diagnosis: Osteoarthritis of hip [M16.9]  Right hip pain [M25.551]  Diagnosis Additional Information: No value filed.    Anesthesia Type:   General     Note:    Oropharynx: oropharynx clear of all foreign objects  Level of Consciousness: awake  Oxygen Supplementation: face mask  Level of Supplemental Oxygen (L/min / FiO2): 10  Independent Airway: airway patency satisfactory and stable  Dentition: dentition unchanged  Vital Signs Stable: post-procedure vital signs reviewed and stable  Report to RN Given: handoff report given  Patient transferred to: PACU    Handoff Report: Identifed the Patient, Identified the Reponsible Provider, Reviewed the pertinent medical history, Discussed the surgical course, Reviewed Intra-OP anesthesia mangement and issues during anesthesia, Set expectations for post-procedure period and Allowed opportunity for questions and acknowledgement of understanding      Vitals:  Vitals Value Taken Time   /105 01/27/25 1201   Temp 36.7  C (98  F) 01/27/25 1158   Pulse 89 01/27/25 1200   Resp 28 01/27/25 1200   SpO2 96 % 01/27/25 1200   Vitals shown include unfiled device data.    Electronically Signed By: FRANKIE Terrell CRNA  January 27, 2025  12:02 PM

## 2025-01-27 NOTE — PROGRESS NOTES
[facility]                                                                                   OUTPATIENT PHYSICAL THERAPY    PLAN OF TREATMENT FOR OUTPATIENT REHABILITATION   Patient's Last Name, First Name, Olesya Moreira YOB: 1950   Provider's Name   [facility]   Medical Record No.  [id number]     Onset Date: 01/27/25 Start of Care Date: 01/27/25     Medical Diagnosis:  s/p R PATRICIO               PT Diagnosis:  Impaired functional mobility Certification Dates:  From: 01/27/25  To: 01/28/25       See note for plan of treatment, functional goals, and certification details.    I CERTIFY THE NEED FOR THESE SERVICES FURNISHED UNDER        THIS PLAN OF TREATMENT AND WHILE UNDER MY CARE (Physician co-signature of this document indicates review and certification of the therapy plan).                01/27/25 1600   Appointment Info   Signing Clinician's Name / Credentials (PT) Christy Pearl, PT, DPT   Quick Adds   Quick Adds Certification   Living Environment   People in Home spouse   Current Living Arrangements house   Home Accessibility stairs to enter home;stairs within home   Number of Stairs, Main Entrance 2   Stair Railings, Main Entrance railings safe and in good condition   Number of Stairs, Within Home, Primary greater than 10 stairs  (11 stairs)   Stair Railings, Within Home, Primary railings safe and in good condition   Transportation Anticipated family or friend will provide   Self-Care   Usual Activity Tolerance good   Current Activity Tolerance moderate   Equipment Currently Used at Home none   Fall history within last six months no   Activity/Exercise/Self-Care Comment Patient will need a FWW issued at discharge.   General Information   Onset of Illness/Injury or Date of Surgery 01/27/25   Referring Physician Moe Lopez MD   Patient/Family Therapy Goals Statement (PT) Return home   Pertinent History of Current  Problem (include personal factors and/or comorbidities that impact the POC) s/p R PATRICIO   Existing Precautions/Restrictions fall;no hip IR;90 degree hip flexion;weight bearing   Weight-Bearing Status - LLE full weight-bearing   Weight-Bearing Status - RLE weight-bearing as tolerated   Range of Motion (ROM)   Range of Motion ROM deficits secondary to surgical procedure   Strength (Manual Muscle Testing)   Strength (Manual Muscle Testing) Deficits observed during functional mobility   Bed Mobility   Bed Mobility supine-sit   Supine-Sit [place] (Bed Mobility) supervision;verbal cues   Bed Mobility Limitations decreased ability to use legs for bridging/pushing   Impairments Contributing to Impaired Bed Mobility pain;decreased ROM;decreased strength   Assistive Device (Bed Mobility) bed rails   Comment, (Bed Mobility) Verbal cues provided for posterior hip precautions.   Transfers   Transfers sit-stand transfer   Maintains Weight-bearing Status (Transfers) able to maintain   Impairments Contributing to Impaired Transfers pain;decreased ROM;decreased strength   Sit-Stand Transfer   Sit-Stand [place] (Transfers) supervision;verbal cues   Assistive Device (Sit-Stand Transfers) walker, front-wheeled   Comment, (Sit-Stand Transfer) Verbal cues provided for posterior hip precautions.   Gait/Stairs (Locomotion)   [place] Level (Gait) contact guard;verbal cues   Assistive Device (Gait) walker, front-wheeled   Distance in Feet (Gait) 5'   Pattern (Gait) step-to   Deviations/Abnormal Patterns (Gait) antalgic;gait speed decreased;stride length decreased;weight shifting decreased   Maintains Weight-bearing Status (Gait) able to maintain   Comment, (Gait/Stairs) Stairs not tested due to fatigue and pain.   Clinical Impression   Criteria for Skilled Therapeutic Intervention Yes, treatment indicated   PT Diagnosis (PT) Impaired functional mobility   Influenced by the following impairments Posterior hip precautions, pain,  decreased ROM, decreased strength   Functional limitations due to impairments Transfers, gait, stairs   Clinical Presentation (PT Evaluation Complexity) stable   Clinical Presentation Rationale Patient presents as medically diagnosed.   Clinical Decision Making (Complexity) low complexity   Planned Therapy Interventions (PT) bed mobility training;gait training;home exercise program;patient/family education;ROM (range of motion);stair training;strengthening;transfer training;home program guidelines   Risk & Benefits of therapy have been explained evaluation/treatment results reviewed;care plan/treatment goals reviewed;patient   PT Total Evaluation Time   PT Eval, Low Complexity Minutes (23978) 10   Therapy Certification   Start of care date 01/27/25   Certification date from 01/27/25   Certification date to 01/28/25   Medical Diagnosis s/p R PATRICIO   Physical Therapy Goals   PT Frequency Daily   PT Predicted Duration/Target Date for Goal Attainment 01/28/25   PT Goals Transfers;Gait;Stairs   PT: Transfers Modified independent;Sit to/from stand;Assistive device;Within precautions  (FWW)   PT: Gait Supervision/stand-by assist;Assistive device;Greater than 200 feet  (FWW)   PT: Stairs Supervision/stand-by assist;2 stairs;Rail on left;Rail on right   Interventions   Interventions Quick Adds Gait Training;Therapeutic Procedure   Therapeutic Procedure/Exercise   Ther. Procedure: strength, endurance, ROM, flexibillity Minutes (47689) 15   Symptoms Noted During/After Treatment fatigue;increased pain   Treatment Detail/Skilled Intervention Patient completed PATRICIO HEP x 10 reps each with supervision and verbal cues for exercise technique and posterior hip precautions. Handout provided.   Gait Training   Gait Training Minutes (93650) 10   Symptoms Noted During/After Treatment (Gait Training) fatigue;increased pain   Treatment Detail/Skilled Intervention Patient continued gait training in room and hallway with CGA, FWW, and chair  follow for safety. Verbal cues provided for B heel strike and increased step length as tolerated. SnO2 87-89% on RA following gait training. RN aware.   Distance in Feet 70'   [place] Level (Gait Training) contact guard   Physical Assistance Level (Gait Training) verbal cues;1 person assist   Weight Bearing (Gait Training) weight-bearing as tolerated   Assistive Device (Gait Training) rolling walker  (FWW)   PT Discharge Planning   PT Plan Transfers, gait with FWW, 2 SAHRA, review PATRICIO HEP   PT Discharge Recommendation (DC Rec) other (see comments)  (Defer to ortho)   PT Rationale for DC Rec Patient is ambulating well with CGA and FWW POD 0. Stairs not tested yet.   PT Brief overview of current status CGA with FWW x 75'.   PT Total Distance Amb During Session (feet) 75   PT Equipment Needed at Discharge walker, rolling  (FWW)   Physical Therapy Time and Intention   Timed Code Treatment Minutes 25   Total Session Time (sum of timed and untimed services) 35   Moe Lopez MD

## 2025-01-27 NOTE — ANESTHESIA PREPROCEDURE EVALUATION
Anesthesia Pre-Procedure Evaluation    Patient: Olesya Stephen   MRN: [id number] : 1950        Procedure : Procedure(s):  RIGHT TOTAL HIP ARTHROPLASTY          Past Medical History:   Diagnosis Date    Anxiety     Arthritis     Cervical radiculopathy     Cervical stenosis of spinal canal     Female stress incontinence     Fibromyalgia     Gastroesophageal reflux disease     Hyperlipidemia     Lumbar radiculopathy     PONV (postoperative nausea and vomiting)     Slow to wake up after anesthesia     Snores       Past Surgical History:   Procedure Laterality Date    ADENOIDECTOMY      CATARACT EXTRACTION Bilateral 2017    CYSTOCELE REPAIR  1995    HYSTERECTOMY  1995    LAMINOPLASTY Right 2018    Procedure: RIGHT OPEN CERVICAL 3,4,5 Laminectomy ,CERVICAL 7 LAMINOPLASTY;  Surgeon: Fernanda Jones MD;  Location: [facility] [place];  Service:       Allergies   Allergen Reactions    Morphine Nausea and Vomiting     Patient reports tolerating other opioids    Penicillins Hives     Childhood Rxn. Has tolerated Keflex as an adult.      Social History     Tobacco Use    Smoking status: Never     Passive exposure: Past    Smokeless tobacco: Never   Substance Use Topics    Alcohol use: Yes     Alcohol/week: 2.0 standard drinks of alcohol     Types: 2 Standard drinks or equivalent per week     Comment: 2 drinks per month      Wt Readings from Last 1 Encounters:   01/15/25 95.2 kg (209 lb 12.8 oz)        Anesthesia Evaluation   Pt has had prior anesthetic.     History of anesthetic complications  - PONV.      ROS/MED HX  ENT/Pulmonary:  - neg pulmonary ROS     Neurologic: Comment: Lumbar radiculopathy with lower extremity symptoms  Hx of cervical laminectomy      Cardiovascular:  - neg cardiovascular ROS     METS/Exercise Tolerance:     Hematologic:  - neg hematologic  ROS     Musculoskeletal:       GI/Hepatic:     (+) GERD (controlled),                   Renal/Genitourinary:  - neg Renal ROS     Endo:      "  Psychiatric/Substance Use:       Infectious Disease:       Malignancy:       Other:      (+)  , H/O Chronic Pain (fibromyalgia),         Physical Exam    Airway        Mallampati: II   TM distance: > 3 FB   Neck ROM: full   Mouth opening: > 3 cm    Respiratory Devices and Support         Dental       (+) Modest Abnormalities - crowns, retainers, 1 or 2 missing teeth      Cardiovascular          Rhythm and rate: regular and normal     Pulmonary           breath sounds clear to auscultation           OUTSIDE LABS:  CBC:   Lab Results   Component Value Date    WBC 9.2 01/03/2025    WBC 10.0 04/28/2022    HGB 14.4 01/03/2025    HGB 13.9 04/28/2022    HCT 45.1 01/03/2025    HCT 44.4 04/28/2022     01/03/2025     04/28/2022     BMP:   Lab Results   Component Value Date     01/03/2025     05/02/2024    POTASSIUM 4.2 01/03/2025    POTASSIUM 4.2 05/02/2024    CHLORIDE 102 01/03/2025    CHLORIDE 102 05/02/2024    CO2 33 (H) 01/03/2025    CO2 26 05/02/2024    BUN 21.4 01/03/2025    BUN 17.9 05/02/2024    CR 0.71 01/03/2025    CR 0.58 05/02/2024     (H) 01/03/2025    GLC 94 05/02/2024     COAGS: No results found for: \"PTT\", \"INR\", \"FIBR\"  POC: No results found for: \"BGM\", \"HCG\", \"HCGS\"  HEPATIC:   Lab Results   Component Value Date    ALBUMIN 4.4 05/02/2024    PROTTOTAL 7.2 05/02/2024    ALT 17 05/02/2024    AST 20 05/02/2024    ALKPHOS 89 05/02/2024    BILITOTAL 0.6 05/02/2024     OTHER:   Lab Results   Component Value Date    TERA 9.3 01/03/2025    TSH 2.21 11/18/2019    T3 79 11/18/2019       Anesthesia Plan    ASA Status:  3    NPO Status:  NPO Appropriate    Anesthesia Type: General.     - Airway: ETT   Induction: Intravenous.   Maintenance: Balanced.        Consents    Anesthesia Plan(s) and associated risks, benefits, and realistic alternatives discussed. Questions answered and patient/representative(s) expressed understanding.     - Discussed: Risks, Benefits and Alternatives for the " "PROCEDURE were discussed     - Discussed with:  Patient, Spouse            Postoperative Care    Pain management: IV analgesics, Oral pain medications.   PONV prophylaxis: Ondansetron (or other 5HT-3), Dexamethasone or Solumedrol, Background Propofol Infusion     Comments:               Sukhjinder Benítez MD    I have reviewed the pertinent notes and labs in the chart from the past 30 days and (re)examined the patient.  Any updates or changes from those notes are reflected in this note.    Clinically Significant Risk Factors Present on Admission                             # Obesity: Estimated body mass index is 37.16 kg/m  as calculated from the following:    Height as of this encounter: 1.6 m (5' 3\").    Weight as of this encounter: 95.2 kg (209 lb 12.8 oz).                "

## 2025-01-27 NOTE — BRIEF OP NOTE
[facility]    Brief Operative Note    Pre-operative diagnosis: Osteoarthritis of hip [M16.9]  Right hip pain [M25.551]  Post-operative diagnosis Same as pre-operative diagnosis    Procedure: RIGHT TOTAL HIP ARTHROPLASTY, Right - Hip    Surgeon: Surgeons and Role:     * Moe Lopez MD - Primary     * Pete Kemp PA-C - Assisting  Anesthesia: Choice   Estimated Blood Loss: 200 ml    Drains: None  Specimens: * No specimens in log *  Findings:   None.  Complications: None.  Implants:   Implant Name Type Inv. Item Serial No.  Lot No. LRB No. Used Action   SHELL ACTB 50MM HIP CH HA TRDNT II PSL D STRL 742-11-50D - [id number] Total Joint Component/Insert SHELL ACTB 50MM HIP CH HA TRDNT II PSL D STRL 742-11-50D  Dispop [id number] Right 1 Implanted   GUIDE PIN LONG ACETABULAR - [id number] Wire GUIDE PIN LONG ACETABULAR  [employer] [id number] Right 1 Used as a Supply   IMP SCR STRK LOW PROFILE HEX 6.5X25MM [id number] - [id number] Metallic Hardware/[place] IMP SCR STRK LOW PROFILE HEX 6.5X25MM [id number]  [employer] [id number] Right 1 Implanted   6.5MM LOW PROFILE HEX SCR 20MM - [id number] Metallic Hardware/[place] 6.5MM LOW PROFILE HEX SCR 20MM  [employer] [id number] Right 1 Implanted and Explanted   6.5MM LOW PROFILE HEX SCR 15MM - [id number] Metallic Hardware/[place] 6.5MM LOW PROFILE HEX SCR 15MM  [employer] [id number] Right 1 Implanted   INSERT ACTB 10DEG 32MM 0D D HIP X3 TRDNT STRL LF - [id number] Total Joint Component/Insert INSERT ACTB 10DEG 32MM 0D D HIP X3 TRDNT STRL LF  Dispop [id number] Right 1 Implanted   IMPLANT HIP STM 105MM NK 38.5MM INSG 5 HI STM HI OFST STRL - [id number] Total Joint Component/Insert IMPLANT HIP STM 105MM NK 38.5MM INSG 5 HI STM HI OFST STRL  Dispop [id number] Right 1 Implanted   IMP HEAD FEMORAL STRK BIOLOX DELTA CERAMIC 32MM 0MM - [id number] Total Joint Component/Insert IMP HEAD FEMORAL STRK  BIOLOX DELTA CERAMIC 32MM 0MM  [employer] [id number] Right 1 Implanted

## 2025-01-27 NOTE — ANESTHESIA POSTPROCEDURE EVALUATION
Patient: Olesya Stephen    Procedure: Procedure(s):  RIGHT TOTAL HIP ARTHROPLASTY       Anesthesia Type:  General    Note:     Postop Pain Control: Uneventful            Sign Out: Well controlled pain   PONV: No   Neuro/Psych: Uneventful            Sign Out: Acceptable/Baseline neuro status   Airway/Respiratory: Uneventful            Sign Out: Acceptable/Baseline resp. status   CV/Hemodynamics: Uneventful            Sign Out: Acceptable CV status; No obvious hypovolemia; No obvious fluid overload   Other NRE: NONE   DID A NON-ROUTINE EVENT OCCUR? No           Last vitals:  Vitals Value Taken Time   /63 01/27/25 1301   Temp 37  C (98.6  F) 01/27/25 1240   Pulse 67 01/27/25 1319   Resp 16 01/27/25 1255   SpO2 97 % 01/27/25 1319   Vitals shown include unfiled device data.    Electronically Signed By: Sukhjinder Benítez MD  January 27, 2025  1:20 PM

## 2025-01-27 NOTE — CONSULTS
"[facility]  Consult Note - Hospitalist Service  Date of Admission:  1/27/2025  Consult Requested by: orthopaedics  Reason for Consult: post operative medical management    Assessment & Plan   Olesya Stephen is a 74 year old female admitted s/p R PATRICIO.  She is clinically stable.  Recommendations as below.    # s/p R PATRICIO  - per orthopaedics    # Anxiety  - escitalopram    # GERD  - famotidine       Clinically Significant Risk Factors Present on Admission                             # Obesity: Estimated body mass index is 37.16 kg/m  as calculated from the following:    Height as of this encounter: 1.6 m (5' 3\").    Weight as of this encounter: 95.2 kg (209 lb 12.8 oz).              Sebastián Paz MD  Hospitalist Service  Securely message with Vocera (more info)  Text page via Mayomi Paging/Directory   ______________________________________________________________________    Chief Complaint   S/p R PATRICIO    History is obtained from the patient    History of Present Illness   Olesya Stephen is a 74 year old female who is admitted s/p R PATRICIO.  She reports hip pain.  Her legs feel heavy.  She denies chest pain, chest pressure, dyspnea, nausea, or abdominal pain.      Past Medical History    Past Medical History:   Diagnosis Date    Anxiety     Arthritis     Cervical radiculopathy     Cervical stenosis of spinal canal     Female stress incontinence     Fibromyalgia     Gastroesophageal reflux disease     Hyperlipidemia     Lumbar radiculopathy     PONV (postoperative nausea and vomiting)     Slow to wake up after anesthesia 1995    Snores        Past Surgical History   Past Surgical History:   Procedure Laterality Date    ADENOIDECTOMY      CATARACT EXTRACTION Bilateral 2017    CYSTOCELE REPAIR  1995    HYSTERECTOMY  1995    LAMINOPLASTY Right 06/12/2018    Procedure: RIGHT OPEN CERVICAL 3,4,5 Laminectomy ,CERVICAL 7 LAMINOPLASTY;  Surgeon: Fernanda Jones MD;  Location: [facility] OR;  " Service:        Medications   I have reviewed this patient's current medications          Physical Exam   Vital Signs: Temp: 98  F (36.7  C) Temp src: Temporal BP: (!) 166/71 Pulse: 87   Resp: 13 SpO2: 98 % O2 Device: Nasal cannula Oxygen Delivery: 4 LPM  Weight: 209 lbs 12.8 oz    Gen:  lying in bed in no extremis  Neuro:  alert, conversant; moves toes and sensation grossly intact in both feet  CV: nl rate, regular rhythm  Pulm:  no acute resp distress, ctab anteriorly  GI:  abdomen NTTP    Medical Decision Making             Data   Reviewed:    WBC 8.3  Hgb 14  Plts 223

## 2025-01-27 NOTE — OP NOTE
Total Hip Arthoplasty Operative Note        PLAN:  Weight bearing status: Weight bearing as tolerated   Activity: Activity as tolerated  Patient may move about with assist as indicated or with supervision   Anticoagulation plan:                 ASA 325mg po qd  for 42 days  Follow up plan                           Follow up in 2 week(s)        Name: Olesya JEANNIE Zafar    PCP: Francine Piña    Procedure Date: 1/27/2025    Pre-operative diagnosis: Osteoarthritis of hip [M16.9]  Right hip pain [M25.551]   Post-operative diagnosis: Same   Procedure: Total hip arthoplasty (Right)   Surgeon: Moe Lopez MD     Assistant(s): Pete Kemp PA-C   Anesthesia: Spinal Anesthesia   Estimated blood loss: 200 ml   Drains: None   Specimens: None       Findings: See full dictated operative note for details   Complications: None       Comments: See dictated operative report for full details     Indications:    The patient has experienced progressive right hip pain despite use of  Analgesics, NSAID's, Injection therapy and physical therapy. Because of the failure of these therapies to control symptoms and limited walking, night pain and altered activities the patient has decided to move ahead with joint replacement surgery.        Procedure and Findings:    After being informed of the risks, benefits, and alternatives to the procedure, the patient desired to proceed. Brought to the main operating suite where the patient was placed under spinal anesthetic. The patient was positioned in an Innomed hip briones. The patient s Right lower extremity was prepped and draped in a manner appropriate for the procedure after a timeout verification step was complete.    Patient received 2 grams of intravenous Ancef. Pete Kemp PA-C was present for the entire length of the case for the purposes of proper patient positioning, surgical exposure, and patient safety.    A minimally invasive posterior approach to the hip was taken. IT  band was divided. Gluteus fibers divided and the Charnley retractor was placed. Attention was directed towards the external rotators. The piriformis was identified and tagged. Minimus was elevated. The capsule was teed and tagged. Hip leg length and offset were then determined using a Smith and Nephew device with a pin in the innominate and the hip was then dislocated. The neck was resected using the Pavlok guide. Full thickness cartilage loss was demonstrated involving the femoral head and acetabulum.     Attention was directed toward the acetabulum. Retractors were placed. Soft tissues were resected. Sequential reaming from 46 to 51 mm was carried out. Good cancellous bleeding bed was demonstrated. The trail 50 mm cup was stable. The final 50 mm TRident 2 HA PSL cup was selected and secured with 2 supplemental fixation screws. A 10 degree liner was placed. Attention was directed towards the femur. Box chisel, canal finder, sequential broaching up to 5 was carried out. Trial reductions were carried out and excellent range of motion and stability and restoration of leg length and offset was demonstrated with a 32mm, +0 head. X-ray was obtained which demonstrated appropriate sizing and positioning of components. The trial components were then removed. The final 10 degree liner was secured. Inferior osteophytes were resected from the acetabulum. The implant 5 Insignia, 127 degree offset stem was the secured. Trial reductions were carried out and a +0, 32 mm head was selected. The hip was reduced. The joint was copiously irrigated. The joint capsule and piriformis tendon was repaired back to the greater trochanter through drill holes in bone. Soft tissues were infiltrated with anesthetic solution. Care was taken to avoid neurovascular structures.   The IT band was closed with running #1 running Stratafix stitch. Subcutaneous closure With layered 2-0 Vicryl, skin was closed with 3-0 Stratafix and Aquacell dressing.  Sterile dressing was applied.  The patient returned to PAR in stable condition.       Moe Lopez MD    Date: 1/27/2025 Time: 11:33 AM      CONFIDENTIALITY NOTICE This message and any included attachments are from [facility] and are intended only for the addressee. The information contained in this message is confidential and may constitute inside or non-public information under international, federal, or state securities laws. Unauthorized forwarding, printing, copying, distribution, or use of such information is strictly prohibited and may be unlawful. If you are not the addressee, please promptly delete this message and notify the sender of the delivery error by e-mail.

## 2025-01-27 NOTE — INTERVAL H&P NOTE
"I have reviewed the surgical (or preoperative) H&P that is linked to this encounter, and examined the patient. There are no significant changes    Clinical Conditions Present on Arrival:  Clinically Significant Risk Factors Present on Admission                       # Obesity: Estimated body mass index is 37.16 kg/m  as calculated from the following:    Height as of this encounter: 1.6 m (5' 3\").    Weight as of this encounter: 95.2 kg (209 lb 12.8 oz).       "

## 2025-01-27 NOTE — ANESTHESIA PROCEDURE NOTES
Airway       Patient location during procedure: OR       Procedure Start/Stop Times: 1/27/2025 9:51 AM  Staff -        Anesthesiologist:  Sukhjinder Benítez MD       CRNA: Marisol Gay APRN CRNA       Performed By: CRNA  Consent for Airway        Urgency: elective  Indications and Patient Condition       Indications for airway management: mehnaz-procedural       Induction type:intravenous       Mask difficulty assessment: 1 - vent by mask    Final Airway Details       Final airway type: endotracheal airway       Successful airway: ETT - single  Endotracheal Airway Details        ETT size (mm): 7.0       Cuffed: yes       Cuff volume (mL): 6       Successful intubation technique: video laryngoscopy       VL Blade Size: Glidescope 3       Grade View of Cords: 1       Adjucts: stylet       Position: Right       Measured from: lips       Secured at (cm): 22       Bite block used: None    Post intubation assessment        Placement verified by: capnometry and equal breath sounds        Number of attempts at approach: 1       Number of other approaches attempted: 0       Secured with: tape       Ease of procedure: easy       Dentition: Intact    Medication(s) Administered   Medication Administration Time: 1/27/2025 9:51 AM

## 2025-01-28 ENCOUNTER — APPOINTMENT (OUTPATIENT)
Dept: PHYSICAL THERAPY | Facility: CLINIC | Age: 75
End: 2025-01-28
Attending: ORTHOPAEDIC SURGERY
Payer: COMMERCIAL

## 2025-01-28 ENCOUNTER — APPOINTMENT (OUTPATIENT)
Dept: OCCUPATIONAL THERAPY | Facility: CLINIC | Age: 75
End: 2025-01-28
Attending: ORTHOPAEDIC SURGERY
Payer: COMMERCIAL

## 2025-01-28 ENCOUNTER — APPOINTMENT (OUTPATIENT)
Dept: RADIOLOGY | Facility: CLINIC | Age: 75
End: 2025-01-28
Attending: HOSPITALIST
Payer: COMMERCIAL

## 2025-01-28 LAB
FASTING STATUS PATIENT QL REPORTED: ABNORMAL
GLUCOSE SERPL-MCNC: 157 MG/DL (ref 70–99)
HGB BLD-MCNC: 11.2 G/DL (ref 11.7–15.7)

## 2025-01-28 PROCEDURE — 36415 COLL VENOUS BLD VENIPUNCTURE: CPT | Performed by: ORTHOPAEDIC SURGERY

## 2025-01-28 PROCEDURE — 97535 SELF CARE MNGMENT TRAINING: CPT | Mod: GO

## 2025-01-28 PROCEDURE — 82947 ASSAY GLUCOSE BLOOD QUANT: CPT | Performed by: ORTHOPAEDIC SURGERY

## 2025-01-28 PROCEDURE — 85018 HEMOGLOBIN: CPT | Performed by: ORTHOPAEDIC SURGERY

## 2025-01-28 PROCEDURE — 250N000013 HC RX MED GY IP 250 OP 250 PS 637: Performed by: NURSE PRACTITIONER

## 2025-01-28 PROCEDURE — 250N000013 HC RX MED GY IP 250 OP 250 PS 637: Performed by: ORTHOPAEDIC SURGERY

## 2025-01-28 PROCEDURE — 97116 GAIT TRAINING THERAPY: CPT | Mod: GP

## 2025-01-28 PROCEDURE — 71045 X-RAY EXAM CHEST 1 VIEW: CPT

## 2025-01-28 PROCEDURE — 250N000011 HC RX IP 250 OP 636: Mod: JZ | Performed by: ORTHOPAEDIC SURGERY

## 2025-01-28 PROCEDURE — 250N000013 HC RX MED GY IP 250 OP 250 PS 637: Performed by: HOSPITALIST

## 2025-01-28 PROCEDURE — 97166 OT EVAL MOD COMPLEX 45 MIN: CPT | Mod: GO

## 2025-01-28 PROCEDURE — 97110 THERAPEUTIC EXERCISES: CPT | Mod: GP

## 2025-01-28 RX ORDER — TRAMADOL HYDROCHLORIDE 50 MG/1
50 TABLET ORAL EVERY 4 HOURS PRN
Status: DISCONTINUED | OUTPATIENT
Start: 2025-01-28 | End: 2025-01-29 | Stop reason: HOSPADM

## 2025-01-28 RX ADMIN — POLYETHYLENE GLYCOL 3350 17 G: 17 POWDER, FOR SOLUTION ORAL at 08:56

## 2025-01-28 RX ADMIN — ACETAMINOPHEN 975 MG: 325 TABLET ORAL at 00:42

## 2025-01-28 RX ADMIN — ACETAMINOPHEN 975 MG: 325 TABLET ORAL at 08:55

## 2025-01-28 RX ADMIN — SENNOSIDES AND DOCUSATE SODIUM 1 TABLET: 50; 8.6 TABLET ORAL at 20:55

## 2025-01-28 RX ADMIN — METHOCARBAMOL 250 MG: 500 TABLET ORAL at 20:59

## 2025-01-28 RX ADMIN — TRAMADOL HYDROCHLORIDE 50 MG: 50 TABLET, COATED ORAL at 15:23

## 2025-01-28 RX ADMIN — APIXABAN 2.5 MG: 2.5 TABLET, FILM COATED ORAL at 11:25

## 2025-01-28 RX ADMIN — OXYCODONE 10 MG: 5 TABLET ORAL at 00:42

## 2025-01-28 RX ADMIN — ESCITALOPRAM OXALATE 10 MG: 10 TABLET ORAL at 21:00

## 2025-01-28 RX ADMIN — SENNOSIDES AND DOCUSATE SODIUM 1 TABLET: 50; 8.6 TABLET ORAL at 08:55

## 2025-01-28 RX ADMIN — FAMOTIDINE 20 MG: 20 TABLET, FILM COATED ORAL at 08:55

## 2025-01-28 RX ADMIN — FAMOTIDINE 20 MG: 20 TABLET, FILM COATED ORAL at 20:55

## 2025-01-28 RX ADMIN — CEFAZOLIN SODIUM 2 G: 2 INJECTION, SOLUTION INTRAVENOUS at 02:56

## 2025-01-28 RX ADMIN — ROSUVASTATIN CALCIUM 10 MG: 10 TABLET, FILM COATED ORAL at 21:00

## 2025-01-28 RX ADMIN — ACETAMINOPHEN 975 MG: 325 TABLET ORAL at 17:14

## 2025-01-28 RX ADMIN — APIXABAN 2.5 MG: 2.5 TABLET, FILM COATED ORAL at 20:55

## 2025-01-28 ASSESSMENT — ACTIVITIES OF DAILY LIVING (ADL)
ADLS_ACUITY_SCORE: 39
ADLS_ACUITY_SCORE: 47
ADLS_ACUITY_SCORE: 39
ADLS_ACUITY_SCORE: 39
ADLS_ACUITY_SCORE: 47
ADLS_ACUITY_SCORE: 39
ADLS_ACUITY_SCORE: 39
ADLS_ACUITY_SCORE: 47
ADLS_ACUITY_SCORE: 39
ADLS_ACUITY_SCORE: 39
PREVIOUS_RESPONSIBILITIES: MEDICATION MANAGEMENT;FINANCES;DRIVING
ADLS_ACUITY_SCORE: 47

## 2025-01-28 NOTE — PLAN OF CARE
PRIMARY DIAGNOSIS: Total R hip Arthoplasty   OUTPATIENT/OBSERVATION GOALS TO BE MET BEFORE DISCHARGE:  1. Stable vital signs No  2. Tolerating diet:Yes  3. Pain controlled with oral pain medications:  Yes  4. Positive bowel sounds:  Yes  5. Voiding without difficulty:  Yes  6. Able to ambulate:  Yes  7. Provider specific discharge goals met:  Yes    Discharge Planner Nurse   Safe discharge environment identified: Yes  Barriers to discharge: No       Entered by: Faith Spear RN 01/27/2025 10:56 PM     Please review provider order for any additional goals.   Nurse to notify provider when observation goals have been met and patient is ready for discharge.Goal Outcome Evaluation:       Ambulating with with assist of one with walker and gait belt. PRN and scheduled pain medications per MAR. Requiring 3L NC to maintain O2 >90%.

## 2025-01-28 NOTE — PROGRESS NOTES
Brief staff note    Chart review only.  Vitals unremarkable, on 2L oxygen this morning.  Hgb 11.   Discharge order already in place per primary service.  Recommend ensuring patient can wean to room air prior to discharge.    I discontinued a redundant tylenol order and discontinued celecoxib given ibuprofen is also in the patient's medication list.

## 2025-01-28 NOTE — PLAN OF CARE
Patient vital signs are at baseline: No,  Reason:  On 3L o2 via nasal cannula  Patient able to ambulate as they were prior to admission or with assist devices provided by therapies during their stay:  Yes  Patient MUST void prior to discharge:  Yes  Patient able to tolerate oral intake:  Yes  Pain has adequate pain control using Oral analgesics:  Yes  Does patient have an identified :  Yes  Has goal D/C date and time been discussed with patient:  Yes

## 2025-01-28 NOTE — PLAN OF CARE
Problem: Adult Inpatient Plan of Care  Goal: Absence of Hospital-Acquired Illness or Injury  Intervention: Identify and Manage Fall Risk  Problem: Hip Arthroplasty  Goal: Absence of Bleeding  Intervention: Monitor and Manage Bleeding   Problem: Adult Inpatient Plan of Care  Goal: Optimal Comfort and Wellbeing  Intervention: Monitor Pain and Promote Comfort   Problem: Adult Inpatient Plan of Care  Goal: Absence of Hospital-Acquired Illness or Injury  Intervention: Prevent and Manage VTE (Venous Thromboembolism) Risk    Goal Outcome Evaluation:  Patient vital signs are at baseline: Yes  Patient able to ambulate as they were prior to admission or with assist devices provided by therapies during their stay:  Yes  Patient MUST void prior to discharge:  Yes  Patient able to tolerate oral intake:  Yes  Pain has adequate pain control using Oral analgesics:  Yes. Scheduled Tylenol and PRN Oxycodone administered for pain control. Utilized ice packs for comfort.   Does patient have an identified :  Yes  Has goal D/C date and time been discussed with patient:  Yes

## 2025-01-28 NOTE — PROGRESS NOTES
01/28/25 0724   Appointment Info   Signing Clinician's Name / Credentials (OT) JUN Bethea   Student Supervision Therapy services provided with the co-signing licensed therapist guiding and directing the services, and providing the skilled judgement and assessment throughout the session   Quick Adds   Quick Adds Certification   Living Environment   People in Home spouse   Current Living Arrangements house  (2 levels)   Living Environment Comments WIS w/GB and shower chair, RTS w/GB, bedroom and bathroom upstairs.   Self-Care   Usual Activity Tolerance good   Current Activity Tolerance moderate   Equipment Currently Used at Home shower chair;raised toilet seat;grab bar, toilet;grab bar, tub/shower   Instrumental Activities of Daily Living (IADL)   Previous Responsibilities medication management;finances;driving   IADL Comments  usually cooks, cleans, and does laundry.   General Information   Onset of Illness/Injury or Date of Surgery 01/27/25   Referring Physician Moe Lopez MD   Patient/Family Therapy Goal Statement (OT) To go home   Additional Occupational Profile Info/Pertinent History of Current Problem Admitted for R PATRICIO. PMH of anxiety, arthritis, cervical radiculopathy, cervical stenosis of spinal canal, female stress incontinence, fibromyalgia, gastroesophageal reflux disease, hyperlipidemia, lumbar radiculopathy, and PONV (postoperative nausea and vomiting).   Existing Precautions/Restrictions (S)  fall;no hip IR;90 degree hip flexion   Right Lower Extremity (Weight-bearing Status) weight-bearing as tolerated (WBAT)   Cognitive Status Examination   Follows Commands WNL   Visual Perception   Visual Impairment/Limitations corrective lenses for reading   Bed Mobility   Bed Mobility supine-sit;sit-supine;scooting/bridging   Scooting/Bridging [place] (Bed Mobility) supervision;verbal cues   Supine-Sit [place] (Bed Mobility) supervision;verbal cues   Sit-Supine [place] (Bed  Mobility) supervision;verbal cues   Assistive Device (Bed Mobility) bed rails   Comment (Bed Mobility) Verbal cues to avoid internal rotation   Transfers   Transfers bed-chair transfer;sit-stand transfer;toilet transfer   Transfer Comments on 2L of O2 during all transfers   Transfer Skill: Bed to Chair/Chair to Bed   Bed-Chair [place] (Transfers) supervision;verbal cues   Assistive Device (Bed-Chair Transfers) rolling walker   Sit-Stand Transfer   Sit-Stand [place] (Transfers) supervision;verbal cues   Assistive Device (Sit-Stand Transfers) walker, front-wheeled   Toilet Transfer   Type (Toilet Transfer) sit-stand;stand-sit   [place] Level (Toilet Transfer) supervision;verbal cues   Assistive Device (Toilet Transfer) walker, front-wheeled;raised toilet seat;grab bars/safety frame   Activities of Daily Living   BADL Assessment/Intervention lower body dressing   Lower Body Dressing Assessment/Training   Position (Lower Body Dressing) supported sitting   [place] Level (Lower Body Dressing) doff;don;pants/bottoms;socks;dependent (less than 25% patient effort);verbal cues   Clinical Impression   Criteria for Skilled Therapeutic Interventions Met (OT) Yes, treatment indicated   OT Diagnosis Decreased independence in ADLs and transfers due to R PATRICIO.   OT Problem List-Impairments impacting ADL activity tolerance impaired;mobility;post-surgical precautions   Assessment of Occupational Performance 3-5 Performance Deficits   Identified Performance Deficits LB dressing, toileting, bathing, transfers, bed mobility   Planned Therapy Interventions (OT) ADL retraining   Clinical Decision Making Complexity (OT) detailed assessment/moderate complexity   Risk & Benefits of therapy have been explained evaluation/treatment results reviewed;participants included;patient   OT Total Evaluation Time   OT Eval, Moderate Complexity Minutes (54562) 15   Therapy Certification   Medical Diagnosis R PATRICIO   Start of Care Date  01/28/25   Certification date from 01/28/25   Certification date to 01/29/25   OT Goals   Therapy Frequency (OT) One time eval and treatment   OT Predicted Duration/Target Date for Goal Attainment 01/29/25   OT Goals Lower Body Dressing;Toilet Transfer/Toileting   OT: Lower Body Dressing Modified independent;using adaptive equipment;within precautions   OT: Toilet Transfer/Toileting Modified independent;using adaptive equipment;within precautions   Interventions   Interventions Quick Adds Self-Care/Home Management   Self-Care/Home Management   Self-Care/Home Mgmt/ADL, Compensatory, Meal Prep Minutes (20805) 30   Symptoms Noted During/After Treatment (Meal Preparation/Planning Training) none   Treatment Detail/Skilled Intervention Pt seated in chair upon arrival. Initial evaluation completed. Pt on 2L of NC O2 w/sats consistent around 96%. Pt completed STS, bed to chair, and toilet transfer w/FWW, SBA and VC for hand placement and to follow precautions. Educated on use of dressing equipment (sock aid and reacher) and pt demonstrated understanding with usage of equipment. Educated on and demonstrated WIS transfer and pt verbalized understanding. Pt transferred to bed and sit to supine w/SBA. VC to follow precautions during sit to supine and bridging in bed. Educated on pillow/wedge use while sleeping and icing for pain and swelling. Pt verbalized understanding. Pt transferred back to chair w/FWW and mod I. Educated on car transfer and kitchen mobility. Chair alarm on, call light in reach, and handoff to PT.   OT Discharge Planning   OT Plan D/C OT   OT Discharge Recommendation (DC Rec) (S)  other (see comments)  (defer to ortho team)   OT Rationale for DC Rec Pt using AE successfully, stable during functional transfers. Will have  to assist at home.   OT Brief overview of current status SBA to mod I during transfers and ADLs. Needed VC to follow precautions (specifically no int. rotation). On 2L of O2 through  NC.   OT Total Distance Amb During Session (feet) 20   Total Session Time   Timed Code Treatment Minutes 30   Total Session Time (sum of timed and untimed services) 45   [facility]                                                                                   OUTPATIENT OCCUPATIONAL THERAPY    PLAN OF TREATMENT FOR OUTPATIENT REHABILITATION   Patient's Last Name, First Name, Olesya Moreira YOB: 1950   Provider's Name   [facility]   Medical Record No.  [id number]     Onset Date: 01/27/25 Start of Care Date: 01/28/25     Medical Diagnosis:  R PATRICIO               OT Diagnosis:  Decreased independence in ADLs and transfers due to R PATRICIO. Certification Dates:  From: 01/28/25  To: 01/29/25     See note for plan of treatment, functional goals, and certification details.    I CERTIFY THE NEED FOR THESE SERVICES FURNISHED UNDER        THIS PLAN OF TREATMENT AND WHILE UNDER MY CARE (Physician co-signature of this document indicates review and certification of the therapy plan).              Moe Lopez MD

## 2025-01-28 NOTE — PLAN OF CARE
Patient vital signs are at baseline: No,  Reason: O2 sats drop to mid 80's with activity  Patient able to ambulate as they were prior to admission or with assist devices provided by therapies during their stay:  Yes  Patient MUST void prior to discharge:  Yes  Patient able to tolerate oral intake:  Yes  Pain has adequate pain control using Oral analgesics:  Yes  Does patient have an identified :  Yes  Has goal D/C date and time been discussed with patient:  Yes    Patient maintaining O2 sats above 90% at rest. With activity sats drop to 85-87%. Patient denies shortness of breath. Encouraging use of IS. CXR done today. Will have overnight oximetry study done tonight. Pain in hip controlled with scheduled Tylenol and prn Tramadol. Up ambulating with standby assist, gait belt, and walker. Plan is to discharge to home when medically cleared.

## 2025-01-28 NOTE — PROVIDER NOTIFICATION
Notified oncall provider Dr. Banda from [facility] that patient refused scheduled Aspirin. Patient states she does not tolerate Aspirin. Per Dr. Banda ok to skip tonight's dose and discuss a change tomorrow with with in house rounding provider.

## 2025-01-28 NOTE — PROGRESS NOTES
"[facility]    Medicine Progress Note - Hospitalist Service    Date of Admission:  1/27/2025    Assessment & Plan   Olesya Stephen is a 74 year old female admitted s/p R PATRICIO.  She is clinically stable.  Had 2L oxygen requirement earlier, now weaned to room air at rest.  CXR unrevealing.  Nursing repeated ambulatory oximetry around 1500 and patient has hypoxia to 85% with ambulation.  Patient is not appropriate for discharge from a medicine perspective given need for supplemental oxygen with activity.  Recommend ongoing pulmonary hygiene.  Encourage incentive spirometry use.      Nursing also reported that the patient had desats to 86% while sleep which rapidly resolved after wakening.  The patient had also reported a history of snoring.  Given possibility for untreated sleep apnea, referral to sleep medicine ordered.  Will also check nocturnal oximetry.    # s/p R PATRICIO  - per orthopaedics    # Hypoxia, suspect atelectasis  # Possible untreated THERESE  - nocturnal oximetry    # Anxiety  - escitalopram    # GERD  - famotidine          Diet: Advance Diet as Tolerated: Regular Diet Adult  Discharge Instruction - Regular Diet Adult      Anaya Catheter: Not present  Lines: None     Cardiac Monitoring: None  Code Status: Full Code      Clinically Significant Risk Factors Present on Admission                             # Obesity: Estimated body mass index is 37.16 kg/m  as calculated from the following:    Height as of this encounter: 1.6 m (5' 3\").    Weight as of this encounter: 95.2 kg (209 lb 12.8 oz).              Social Drivers of Health            Disposition Plan     Medically Ready for Discharge: Anticipated Today             Sebastián Paz MD  Hospitalist Service  [facility]  Securely message with Anjel (more info)  Text page via [employer] Paging/Directory   ______________________________________________________________________    Interval History   Denies chest " pain, chest pressure, or dyspnea.  Tolerating a diet.  Needing to clear her throat.  She is doing incentive spirometry.    Physical Exam   Vital Signs: Temp: 98  F (36.7  C) Temp src: Oral BP: 110/59 Pulse: 77   Resp: 17 SpO2: 92 % O2 Device: None (Room air) Oxygen Delivery: 1 LPM  Weight: 209 lbs 12.8 oz    Gen:  sitting in chair in no extremis  Neuro:  alert, conversant  CV:  nl rate, regular rhythm  Pulm:  no acute resp distress, CTAB    Medical Decision Making             Data   Reviewed:    Hgb 11    CXR  IMPRESSION: Enlarged cardiac silhouette. No pulmonary vascular congestion, significant effusion, or definite pneumonia.

## 2025-01-28 NOTE — PROGRESS NOTES
"[facility] Orthopaedics Progress Note      Post-operative Day: 1 Day Post-Op    Procedure(s):  RIGHT TOTAL HIP ARTHROPLASTY      Subjective: Patient seen up resting in bedside chair, remains on 1L NC at rest. Denies chest pain or shortness of breath. Does not use O2 at home. No cough or fevers noted. Pain to right hip, tolerable on PO analgesics. Tolerating a regular diet with no nausea or vomiting. Voiding without difficulty and passing gas. Declines ASA overnight. Reports she does not tolerate as makes her stomach upset.       Pain: moderate  Chest pain, SOB:  No      Objective:  Blood pressure 110/59, pulse 77, temperature 98  F (36.7  C), temperature source Oral, resp. rate 17, height 1.6 m (5' 3\"), weight 95.2 kg (209 lb 12.8 oz), SpO2 93%.    Patient Vitals for the past 24 hrs:   BP Temp Temp src Pulse Resp SpO2   01/28/25 1128 -- -- -- -- -- 93 %   01/28/25 1127 -- -- -- -- -- 96 %   01/28/25 1126 -- -- -- -- -- 93 %   01/28/25 1120 -- -- -- -- -- (!) 87 %   01/28/25 0942 -- -- -- -- -- 92 %   01/28/25 0856 -- -- -- -- -- (!) 91 %   01/28/25 0824 110/59 98  F (36.7  C) Oral -- 17 94 %   01/28/25 0642 -- -- -- -- -- 94 %   01/28/25 0042 112/60 97.9  F (36.6  C) Oral 77 18 94 %   01/27/25 2225 135/70 98  F (36.7  C) Oral 63 18 95 %   01/27/25 1820 124/59 97.9  F (36.6  C) Oral 69 18 94 %   01/27/25 1725 (!) 148/70 97.6  F (36.4  C) Oral 73 18 94 %   01/27/25 1625 132/66 97.8  F (36.6  C) Oral 63 16 92 %   01/27/25 1618 -- -- -- -- -- 92 %   01/27/25 1617 -- -- -- -- -- (!) 87 %   01/27/25 1555 120/62 97.8  F (36.6  C) Oral 65 16 (!) 90 %   01/27/25 1525 (!) 162/73 97.7  F (36.5  C) Oral 69 16 (!) 90 %   01/27/25 1500 115/74 -- -- 69 16 93 %   01/27/25 1430 116/71 -- -- 68 16 94 %   01/27/25 1330 120/62 -- -- 69 -- 93 %   01/27/25 1300 111/63 -- -- 69 -- 95 %   01/27/25 1255 127/62 -- -- 70 16 95 %   01/27/25 1245 114/63 -- -- 70 15 95 %   01/27/25 1240 114/63 98.6  F (37  C) Temporal 68 13 98 %       Wt " Readings from Last 4 Encounters:   01/15/25 95.2 kg (209 lb 12.8 oz)   07/11/18 93 kg (205 lb)   06/13/18 93 kg (205 lb)   05/03/18 91.2 kg (201 lb)       General: Alert and orientated, NAD  Respiratory: Non-labored breathing on RA  RLE motor function, sensation, and circulation intact   Yes, Dorsiflexion/plantarflexion intact and equal bilaterally. Moves all other extremities with ease and purpose   Wound status: incisions are clean dry and intact. Yes  Calf tenderness: Bilateral  No    Pertinent Labs   Lab Results: personally reviewed.     Recent Labs   Lab Test 01/28/25  0628 01/27/25  0841 01/03/25  1042 05/02/24  1442 04/21/23  1419 04/28/22  1204   WBC  --  8.3 9.2  --   --  10.0   HGB 11.2* 13.6 14.4  --   --  13.9   HCT  --  41.4 45.1  --   --  44.4   MCV  --  93 96  --   --  96   PLT  --  223 255  --   --  247   NA  --   --  140 141 142 143       Plan:   Encourage aggressive pulmonary hygiene and IS. Hospitalist following   Anticoagulation protocol:  Will change to Eliquis 2.5 mg BID for 42 days.      Pain medications:  scopainmedication: oxycodone, tylenol, and Robaxin   Weight bearing status:  WBAT, posterior hip precautions   Disposition:  Home pending clearance from therapies and hospitalist, weaned down to RA   Continue cares and rehabilitation     Report completed by:  FRANKIE Lake CNP  Date: 1/28/2025  Time: 12:31 PM

## 2025-01-29 VITALS
TEMPERATURE: 98 F | WEIGHT: 209.8 LBS | SYSTOLIC BLOOD PRESSURE: 117 MMHG | HEIGHT: 63 IN | RESPIRATION RATE: 16 BRPM | DIASTOLIC BLOOD PRESSURE: 81 MMHG | BODY MASS INDEX: 37.17 KG/M2 | HEART RATE: 67 BPM | OXYGEN SATURATION: 92 %

## 2025-01-29 LAB
FASTING STATUS PATIENT QL REPORTED: YES
GLUCOSE SERPL-MCNC: 107 MG/DL (ref 70–99)
HGB BLD-MCNC: 10.2 G/DL (ref 11.7–15.7)

## 2025-01-29 PROCEDURE — 97110 THERAPEUTIC EXERCISES: CPT | Mod: GP

## 2025-01-29 PROCEDURE — 97116 GAIT TRAINING THERAPY: CPT | Mod: GP

## 2025-01-29 PROCEDURE — 82947 ASSAY GLUCOSE BLOOD QUANT: CPT | Performed by: ORTHOPAEDIC SURGERY

## 2025-01-29 PROCEDURE — 85018 HEMOGLOBIN: CPT | Performed by: ORTHOPAEDIC SURGERY

## 2025-01-29 PROCEDURE — 94762 N-INVAS EAR/PLS OXIMTRY CONT: CPT

## 2025-01-29 PROCEDURE — 36415 COLL VENOUS BLD VENIPUNCTURE: CPT | Performed by: ORTHOPAEDIC SURGERY

## 2025-01-29 PROCEDURE — 99213 OFFICE O/P EST LOW 20 MIN: CPT | Performed by: HOSPITALIST

## 2025-01-29 PROCEDURE — 250N000013 HC RX MED GY IP 250 OP 250 PS 637: Performed by: NURSE PRACTITIONER

## 2025-01-29 PROCEDURE — 250N000013 HC RX MED GY IP 250 OP 250 PS 637: Performed by: ORTHOPAEDIC SURGERY

## 2025-01-29 RX ADMIN — ACETAMINOPHEN 975 MG: 325 TABLET ORAL at 00:40

## 2025-01-29 RX ADMIN — SENNOSIDES AND DOCUSATE SODIUM 1 TABLET: 50; 8.6 TABLET ORAL at 09:10

## 2025-01-29 RX ADMIN — APIXABAN 2.5 MG: 2.5 TABLET, FILM COATED ORAL at 09:10

## 2025-01-29 RX ADMIN — POLYETHYLENE GLYCOL 3350 17 G: 17 POWDER, FOR SOLUTION ORAL at 09:10

## 2025-01-29 RX ADMIN — FAMOTIDINE 20 MG: 20 TABLET, FILM COATED ORAL at 09:10

## 2025-01-29 RX ADMIN — ACETAMINOPHEN 975 MG: 325 TABLET ORAL at 09:10

## 2025-01-29 ASSESSMENT — ACTIVITIES OF DAILY LIVING (ADL)
ADLS_ACUITY_SCORE: 39
ADLS_ACUITY_SCORE: 41
ADLS_ACUITY_SCORE: 41
ADLS_ACUITY_SCORE: 39
ADLS_ACUITY_SCORE: 41
ADLS_ACUITY_SCORE: 41
ADLS_ACUITY_SCORE: 39
ADLS_ACUITY_SCORE: 41
ADLS_ACUITY_SCORE: 39
ADLS_ACUITY_SCORE: 41
ADLS_ACUITY_SCORE: 39

## 2025-01-29 NOTE — PLAN OF CARE
Patient vital signs are at baseline: No,  Reason:  Oxygen saturations drop to 90% when not using supplemental oxygen. Patient currently on 0.5 L with resulting 95% Sp02.   Patient able to ambulate as they were prior to admission or with assist devices provided by therapies during their stay:  Yes  Patient MUST void prior to discharge:  Yes  Patient able to tolerate oral intake:  Yes  Pain has adequate pain control using Oral analgesics:  Yes  Does patient have an identified :  Yes  Has goal D/C date and time been discussed with patient:  Yes    RT completed nighttime pulse oximetry study. Plan is to discharge home with question on home oxygen needs to be discussed.         Problem: Hip Arthroplasty  Goal: Effective Oxygenation and Ventilation  Intervention: Optimize Oxygenation and Ventilation  Recent Flowsheet Documentation  Taken 1/28/2025 2100 by Chuy Randolph RN  Cough And Deep Breathing: done independently per patient  Activity Management:   activity adjusted per tolerance   activity encouraged  Head of Bed (HOB) Positioning: HOB at 20-30 degrees   Problem: Adult Inpatient Plan of Care  Goal: Absence of Hospital-Acquired Illness or Injury  Intervention: Identify and Manage Fall Risk  Recent Flowsheet Documentation  Taken 1/28/2025 2100 by Chuy Randolph RN  Safety Promotion/Fall Prevention:   supervised activity   safety round/check completed   patient and family education   nonskid shoes/slippers when out of bed   mobility aid in reach

## 2025-01-29 NOTE — PROGRESS NOTES
"[facility] Orthopaedics Progress Note      Post-operative Day: 2 Day Post-Op    Procedure(s):  RIGHT TOTAL HIP ARTHROPLASTY    Subjective: Patient seen up resting in bedside chair, currently on RA. Denies chest pain or shortness of breath.  Pain to right hip tolerable on PO analgesics. Tolerating a regular diet with no nausea or vomiting. Voiding without difficulty and passing gas.     Pain: moderate  Chest pain, SOB:  No    Objective:  Blood pressure 117/81, pulse 67, temperature 98  F (36.7  C), temperature source Oral, resp. rate 16, height 1.6 m (5' 3\"), weight 95.2 kg (209 lb 12.8 oz), SpO2 97%.    Patient Vitals for the past 24 hrs:   BP Temp Temp src Pulse Resp SpO2   01/29/25 0800 117/81 98  F (36.7  C) Oral 67 16 97 %   01/29/25 0102 114/61 98  F (36.7  C) Oral 65 18 94 %   01/29/25 0040 -- -- -- -- -- 94 %   01/28/25 2111 -- -- -- -- -- (!) 91 %   01/28/25 2110 -- -- -- -- -- 94 %   01/28/25 2017 115/56 98  F (36.7  C) Oral -- 18 94 %   01/28/25 1601 133/64 97.5  F (36.4  C) Oral 69 20 94 %   01/28/25 1407 -- -- -- -- -- 92 %   01/28/25 1240 -- -- -- -- -- 93 %   01/28/25 1237 -- -- -- -- -- 95 %       Wt Readings from Last 4 Encounters:   01/15/25 95.2 kg (209 lb 12.8 oz)   07/11/18 93 kg (205 lb)   06/13/18 93 kg (205 lb)   05/03/18 91.2 kg (201 lb)       General: Alert and orientated, NAD  Respiratory: Non-labored breathing on RA  RLE motor function, sensation, and circulation intact   Yes, Dorsiflexion/plantarflexion intact and equal bilaterally. Moves all other extremities with ease and purpose   Wound status: incisions are clean dry and intact. Yes  Calf tenderness: Bilateral  No    Pertinent Labs   Lab Results: personally reviewed.     Recent Labs   Lab Test 01/28/25  0628 01/27/25  0841 01/03/25  1042 05/02/24  1442 04/21/23  1419 04/28/22  1204   WBC  --  8.3 9.2  --   --  10.0   HGB 11.2* 13.6 14.4  --   --  13.9   HCT  --  41.4 45.1  --   --  44.4   MCV  --  93 96  --   --  96   PLT  --  223 " 255  --   --  247   NA  --   --  140 141 142 143       Plan:   Encourage aggressive pulmonary hygiene and IS. Hospitalist following. Suspect component of THERESE, will need outpt follow up sleep study and possible home O2 at night pending assessment.   Anticoagulation protocol:  Will change to Eliquis 2.5 mg BID for 42 days.      Pain medications:  Tramadol, Tylenol, and Robaxin   Weight bearing status:  WBAT, posterior hip precautions   Disposition:  Home pending clearance from therapies and hospitalist, weaned down to RA   Continue cares and rehabilitation     Report completed by:  FRANKIE Lake CNP  Date: 1/29/2025  Time: 10:45 PM

## 2025-01-29 NOTE — PLAN OF CARE
Discharge plan reviewed with patient. Verbalized understanding. Copy of AVS given. Questions answered. PIV removed. All belongings with patient. Discharge home with family provide transportation.     Problem: Adult Inpatient Plan of Care  Goal: Readiness for Transition of Care  Outcome: Adequate for Care Transition

## 2025-01-29 NOTE — PROVIDER NOTIFICATION
Home Oxygen Eval     01/29/25 1100   Home Oxygen Assessment (RN/RT ONLY)   Does patient have oxygen at home? No   1. SpO2 on room air at rest while awake 92   3. SpO2 on room air during Activity/with exercise 88   4. SpO2 with oxygen during activity/with exercise 91       Oxygen LPM during activity/with exercise 1   Does patient qualify for Home O2? Yes

## 2025-01-29 NOTE — CARE PLAN
01/29/25 1100   Home Oxygen Assessment (RN/RT ONLY)   Does patient have oxygen at home? No   1. SpO2 on room air at rest while awake 92   3. SpO2 on room air during Activity/with exercise 88   4. SpO2 with oxygen during activity/with exercise 91       Oxygen LPM during activity/with exercise 1   Does patient qualify for Home O2? Yes

## 2025-01-29 NOTE — DISCHARGE SUMMARY
[facility] Orthopedics Discharge Summary                                  [facility]     HANK DSOUZA [id number]   Age: 74 year old  PCP: Francine Piña, [phone] 1950     Date of Admission:  1/27/2025  Date of Discharge::  1/29/2025  Discharge Provider:  FRANKIE Lake CNP    Code status:  Full Code    Admission Information:  Admission Diagnosis:  Osteoarthritis of hip [M16.9]  Right hip pain [M25.551]    Post-Operative Day: 2 Days Post-Op     Reason for admission:  The patient was admitted for the following:Procedure(s) (LRB):  RIGHT TOTAL HIP ARTHROPLASTY (Right)    Active Problems:    Arthritis of right hip      Allergies:  Morphine and Penicillins    Following the procedure noted above the patient was transferred to the post-op floor and started on:    Therapy:  physical therapy and occupational therapy  Anticoagulation Plan:  Eliquis 2.5mg BID for 42 days     Pain Management: scopainmedication: tramadol and tylenol  Weight bearing status: Weight bearing as tolerated, posterior hip precautions      The patient was followed by Orthopedics during the inpatient treatment course:  Complications:  None  Additional consultations:  Hospitalist      Pertinent Labs   Lab Results: personally reviewed.     Recent Labs   Lab Test 01/29/25  0642 01/28/25  0628 01/27/25  0841 01/03/25  1042 05/02/24  1442 04/21/23  1419 04/28/22  1204   WBC  --   --  8.3 9.2  --   --  10.0   HGB 10.2* 11.2* 13.6 14.4  --   --  13.9   HCT  --   --  41.4 45.1  --   --  44.4   MCV  --   --  93 96  --   --  96   PLT  --   --  223 255  --   --  247   NA  --   --   --  140 141 142 143          Discharge Information:  Condition at discharge: Stable  Discharge destination:  Discharged to home     Medications at discharge:  Current Discharge Medication List        START taking these medications    Details   apixaban ANTICOAGULANT (ELIQUIS) 2.5 MG tablet Take 1 tablet (2.5 mg) by mouth 2 times daily.  Qty: 84  tablet, Refills: 0    Associated Diagnoses: Status post total hip replacement, right      methocarbamol (ROBAXIN) 500 MG tablet Take 1 tablet (500 mg) by mouth 4 times daily as needed for other (pain).  Qty: 60 tablet, Refills: 1    Associated Diagnoses: Status post total hip replacement, right      senna-docusate (SENOKOT-S/PERICOLACE) 8.6-50 MG tablet Take 1-2 tablets by mouth 2 times daily. Take while on oral narcotics to prevent or treat constipation.    Comments: While taking narcotics  Associated Diagnoses: Status post total hip replacement, right      traMADol (ULTRAM) 50 MG tablet Take 1 tablet (50 mg) by mouth every 6 hours as needed for moderate to severe pain.  Qty: 30 tablet, Refills: 0    Associated Diagnoses: Status post total hip replacement, right           CONTINUE these medications which have NOT CHANGED    Details   acetaminophen (TYLENOL) 500 MG tablet Take 500-1,000 mg by mouth every 6 hours as needed for mild pain.      albuterol (PROAIR HFA/PROVENTIL HFA/VENTOLIN HFA) 108 (90 Base) MCG/ACT inhaler Inhale 2 puffs into the lungs every 4 hours as needed for cough.      cyanocobalamin (VITAMIN B-12) 1000 MCG tablet Take 1,000 mcg by mouth daily.      escitalopram (LEXAPRO) 10 MG tablet Take 10 mg by mouth at bedtime.      famotidine (PEPCID) 20 MG tablet Take 20 mg by mouth at bedtime.      guaiFENesin (MUCINEX) 600 MG 12 hr tablet Take 600 mg by mouth 2 times daily as needed for congestion.      ibuprofen (ADVIL/MOTRIN) 200 MG tablet Take 400-800 mg by mouth every 6 hours as needed for pain.      rosuvastatin (CRESTOR) 10 MG tablet Take 10 mg by mouth at bedtime.      vitamin D3 (CHOLECALCIFEROL) 50 mcg (2000 units) tablet Take 1 tablet by mouth daily.           STOP taking these medications       celecoxib (CELEBREX) 100 MG capsule Comments:   Reason for Stopping:                          Follow-Up Care:  Patient should be seen in the office in 14 days by the Orthopedic Surgeon/Physician  Assistant.  Call [phone] for appointment or questions.    It was my pleasure to take care of the above patient.  FRANKIE Lake CNP

## 2025-01-29 NOTE — PROGRESS NOTES
[facility]    Medicine Progress Note - Hospitalist Service    Date of Admission:  1/27/2025    Assessment & Plan   Olesya Stephen is a 74 year old female admitted s/p R PATRICIO.  She is clinically stable.  Oximetry shows 1L oxygen requirement with activity and at night.  Low concern for an infectious etiology.  Lack of tachycardia is also reassuring.  Suspect this is related to atelectasis.  Recommend ongoing pulmonary hygiene and encourage incentive spirometry use.   Plan to discharge patient on home oxygen.  Outpatient sleep medicine referral ordered as well to assess for sleep apnea.    # s/p R PATRICIO  - per orthopaedics    # Hypoxia, suspect atelectasis  # Possible untreated THERESE  - outpatient sleep medicine evaluation    # Anxiety  - escitalopram    # GERD  - famotidine    Home Oxygen attestation:  Oxygen Documentation  I certify that this patient, Olesya Stephen has been under my care (or a nurse practitioner or physican's assistant working with me). This is the face-to-face encounter for oxygen medical necessity.      At the time of this encounter, I have reviewed the qualifying testing and have determined that supplemental oxygen is reasonable and necessary and is expected to improve the patient's condition in a home setting.         Patient has continued oxygen desaturation due to  post operative atelectasis .    If portability is ordered, is the patient mobile within the home? yes    Was this visit performed as a telehealth visit: No               Diet: Advance Diet as Tolerated: Regular Diet Adult  Discharge Instruction - Regular Diet Adult      Anaya Catheter: Not present  Lines: None     Cardiac Monitoring: None  Code Status: Full Code      Clinically Significant Risk Factors Present on Admission                        # Anemia: based on hgb <11       # Obesity: Estimated body mass index is 37.16 kg/m  as calculated from the following:    Height as of this encounter: 1.6 m (5'  "3\").    Weight as of this encounter: 95.2 kg (209 lb 12.8 oz).              Social Drivers of Health            Disposition Plan     Medically Ready for Discharge: Anticipated Today             Sebastián Paz MD  Hospitalist Service  [facility]  Securely message with [employer] (more info)  Text page via [employer] Paging/Directory   ______________________________________________________________________    Interval History   Reports chronic throat congestion.  Denies chest pain, chest pressure, or dyspnea.    Physical Exam   Vital Signs: Temp: 98  F (36.7  C) Temp src: Oral BP: 117/81 Pulse: 67   Resp: 16 SpO2: 92 % O2 Device: None (Room air) Oxygen Delivery: 2 LPM  Weight: 209 lbs 12.8 oz    Gen: lying in chair in no extremis  Neuro: alert, conversant  CV: nl rate, regular rhythm  Pulm: no acute resp distress, ctab anteriorly  GI: abdomen NTTP    Medical Decision Making             Data   Reviewed:    Hgb 10  "

## 2025-01-30 NOTE — PROGRESS NOTES
Physical Therapy Discharge Summary    Reason for therapy discharge:    Discharged to home.    Progress towards therapy goal(s). See goals on Care Plan in [facility] electronic health record for goal details.  Goals not met.  Barriers to achieving goals:   discharge from facility.    Therapy recommendation(s):    Continued therapy is recommended.  Rationale/Recommendations:  Defer to ortho.

## 2025-02-15 ENCOUNTER — HEALTH MAINTENANCE LETTER (OUTPATIENT)
Age: 75
End: 2025-02-15

## 2025-07-10 ENCOUNTER — LAB REQUISITION (OUTPATIENT)
Dept: LAB | Facility: CLINIC | Age: 75
End: 2025-07-10
Payer: COMMERCIAL

## 2025-07-10 DIAGNOSIS — E78.2 MIXED HYPERLIPIDEMIA: ICD-10-CM

## 2025-07-10 DIAGNOSIS — E55.9 VITAMIN D DEFICIENCY, UNSPECIFIED: ICD-10-CM

## 2025-07-10 DIAGNOSIS — E53.8 DEFICIENCY OF OTHER SPECIFIED B GROUP VITAMINS: ICD-10-CM

## 2025-07-10 PROCEDURE — 82607 VITAMIN B-12: CPT | Mod: ORL | Performed by: FAMILY MEDICINE

## 2025-07-10 PROCEDURE — 80053 COMPREHEN METABOLIC PANEL: CPT | Mod: ORL | Performed by: FAMILY MEDICINE

## 2025-07-10 PROCEDURE — 80061 LIPID PANEL: CPT | Mod: ORL | Performed by: FAMILY MEDICINE

## 2025-07-10 PROCEDURE — 82306 VITAMIN D 25 HYDROXY: CPT | Mod: ORL | Performed by: FAMILY MEDICINE

## 2025-07-11 LAB
ALBUMIN SERPL BCG-MCNC: 4.2 G/DL (ref 3.5–5.2)
ALP SERPL-CCNC: 87 U/L (ref 40–150)
ALT SERPL W P-5'-P-CCNC: 16 U/L (ref 0–50)
ANION GAP SERPL CALCULATED.3IONS-SCNC: 11 MMOL/L (ref 7–15)
AST SERPL W P-5'-P-CCNC: 16 U/L (ref 0–45)
BILIRUB SERPL-MCNC: 0.8 MG/DL
BUN SERPL-MCNC: 19.5 MG/DL (ref 8–23)
CALCIUM SERPL-MCNC: 9.3 MG/DL (ref 8.8–10.4)
CHLORIDE SERPL-SCNC: 100 MMOL/L (ref 98–107)
CHOLEST SERPL-MCNC: 207 MG/DL
CREAT SERPL-MCNC: 0.63 MG/DL (ref 0.51–0.95)
EGFRCR SERPLBLD CKD-EPI 2021: >90 ML/MIN/1.73M2
FASTING STATUS PATIENT QL REPORTED: ABNORMAL
FASTING STATUS PATIENT QL REPORTED: NORMAL
GLUCOSE SERPL-MCNC: 85 MG/DL (ref 70–99)
HCO3 SERPL-SCNC: 28 MMOL/L (ref 22–29)
HDLC SERPL-MCNC: 83 MG/DL
LDLC SERPL CALC-MCNC: 99 MG/DL
NONHDLC SERPL-MCNC: 124 MG/DL
POTASSIUM SERPL-SCNC: 3.7 MMOL/L (ref 3.4–5.3)
PROT SERPL-MCNC: 7.2 G/DL (ref 6.4–8.3)
SODIUM SERPL-SCNC: 139 MMOL/L (ref 135–145)
TRIGL SERPL-MCNC: 123 MG/DL
VIT B12 SERPL-MCNC: 200 PG/ML (ref 232–1245)
VIT D+METAB SERPL-MCNC: 36 NG/ML (ref 20–50)

## (undated) DEVICE — SUCTION IRR SYSTEM W/O TIP INTERPULSE HANDPIECE 0210-100-000

## (undated) DEVICE — DRAPE IOBAN INCISE 23X17" 6650EZ

## (undated) DEVICE — DRAPE POUCH INSTRUMENT 3 POCKET 1018L

## (undated) DEVICE — SUTURE VICRYL+ 2-0 27IN CT-1 UND VCP259H

## (undated) DEVICE — BLADE SAGITTAL WIDE (SO-618) 2108-118

## (undated) DEVICE — GLOVE SURG PI ULTRA TOUCH M SZ 7 LF 42670

## (undated) DEVICE — GOWN IMPERVIOUS BREATHABLE 2XL/XLONG

## (undated) DEVICE — SU STRATAFIX MONOCRYL 3-0 SPIRAL PS-2 30CM SXMP1B106

## (undated) DEVICE — VIAL DECANTER STERILE WHITE DYNJDEC06

## (undated) DEVICE — POSITIONER ABDUCTION PILLOW FOAM MED FP-ABDUCTM

## (undated) DEVICE — GLOVE SURG PI ULTRA TOUCH M SZ 8 LF

## (undated) DEVICE — SOL NACL 0.9% IRRIG 1000ML BOTTLE 2F7124

## (undated) DEVICE — SUCTION MANIFOLD NEPTUNE 2 SYS 4 PORT 0702-020-000

## (undated) DEVICE — SU ETHIBOND 1 CT-1 30" X425H

## (undated) DEVICE — SOLUTION IRRIG 2B7127 .9NS 3000ML BAG

## (undated) DEVICE — SOL WATER IRRIG 1000ML BOTTLE 2F7114

## (undated) DEVICE — RETRIVER SUTURE LASSO HOFFEE BLUE 710000

## (undated) DEVICE — DRSG AQUACEL AG HYDROFIBER  3.5X10" 422605

## (undated) DEVICE — CUSTOM PACK TOTAL HIP SOP5BTHHEA

## (undated) DEVICE — SUTURE VICRYL+ 1 27IN CT-1 UND VCP261H

## (undated) DEVICE — BONE CLEANING TIP INTERPULSE  0210-010-000

## (undated) DEVICE — GLOVE BIOGEL INDICATOR 7.5 LF 41675

## (undated) DEVICE — GLOVE UNDER INDICATOR PI SZ 8.5 LF 41685

## (undated) DEVICE — HOLDER LIMB VELCRO OR 0814-1533

## (undated) DEVICE — SU STRATAFIX PDS PLUS 1 CT-1 18" SXPP1A404

## (undated) DEVICE — 6.5MM LOW PROFILE HEX SCREW 20MM
Type: IMPLANTABLE DEVICE | Site: HIP | Status: NON-FUNCTIONAL
Brand: TRIDENT II

## (undated) DEVICE — HOOD SURG T7PLUS PEEL AWAY FACE SHIELD STRL LF 0416-801-100

## (undated) DEVICE — SUCTION SLEEVE NEPTUNE 2 165MM 0703-005-165

## (undated) DEVICE — DRAPE SHEET REV FOLD 3/4 9349

## (undated) DEVICE — ELECTRODE PATIENT RETURN ADULT L10 FT 2 PLATE CORD 0855C

## (undated) DEVICE — ESU ELEC BLADE 6" COATED E1450-6

## (undated) RX ORDER — LIDOCAINE HYDROCHLORIDE 10 MG/ML
INJECTION, SOLUTION EPIDURAL; INFILTRATION; INTRACAUDAL; PERINEURAL
Status: DISPENSED
Start: 2025-01-27

## (undated) RX ORDER — GLYCOPYRROLATE 0.2 MG/ML
INJECTION, SOLUTION INTRAMUSCULAR; INTRAVENOUS
Status: DISPENSED
Start: 2025-01-27

## (undated) RX ORDER — PROPOFOL 10 MG/ML
INJECTION, EMULSION INTRAVENOUS
Status: DISPENSED
Start: 2025-01-27

## (undated) RX ORDER — CEFAZOLIN SODIUM 1 G/3ML
INJECTION, POWDER, FOR SOLUTION INTRAMUSCULAR; INTRAVENOUS
Status: DISPENSED
Start: 2025-01-27

## (undated) RX ORDER — DEXAMETHASONE SODIUM PHOSPHATE 10 MG/ML
INJECTION, SOLUTION INTRAMUSCULAR; INTRAVENOUS
Status: DISPENSED
Start: 2025-01-27

## (undated) RX ORDER — FENTANYL CITRATE 50 UG/ML
INJECTION, SOLUTION INTRAMUSCULAR; INTRAVENOUS
Status: DISPENSED
Start: 2025-01-27

## (undated) RX ORDER — ONDANSETRON 2 MG/ML
INJECTION INTRAMUSCULAR; INTRAVENOUS
Status: DISPENSED
Start: 2025-01-27